# Patient Record
Sex: FEMALE | Race: WHITE | Employment: OTHER | URBAN - METROPOLITAN AREA
[De-identification: names, ages, dates, MRNs, and addresses within clinical notes are randomized per-mention and may not be internally consistent; named-entity substitution may affect disease eponyms.]

---

## 2017-01-30 PROBLEM — E55.9 VITAMIN D DEFICIENCY: Status: ACTIVE | Noted: 2017-01-30

## 2017-01-30 PROBLEM — E03.9 HYPOTHYROIDISM: Status: ACTIVE | Noted: 2017-01-30

## 2019-04-12 ENCOUNTER — HOSPITAL ENCOUNTER (OUTPATIENT)
Age: 59
Discharge: HOME | End: 2019-04-12
Payer: COMMERCIAL

## 2019-04-12 LAB
ALBUMIN SERPL-MCNC: 4.5 G/DL (ref 3.5–5.2)
ALP BLD-CCNC: 72 U/L (ref 35–104)
ALT SERPL-CCNC: 18 U/L (ref 0–32)
ANION GAP SERPL CALCULATED.3IONS-SCNC: 9 MMOL/L (ref 7–16)
AST SERPL-CCNC: 22 U/L (ref 0–31)
BASOPHILS ABSOLUTE: 0.06 E9/L (ref 0–0.2)
BASOPHILS RELATIVE PERCENT: 1.2 % (ref 0–2)
BILIRUB SERPL-MCNC: 0.3 MG/DL (ref 0–1.2)
BUN BLDV-MCNC: 13 MG/DL (ref 6–20)
CALCIUM SERPL-MCNC: 9.5 MG/DL (ref 8.6–10.2)
CHLORIDE BLD-SCNC: 103 MMOL/L (ref 98–107)
CHOLESTEROL, TOTAL: 178 MG/DL (ref 0–199)
CO2: 29 MMOL/L (ref 22–29)
CREAT SERPL-MCNC: 0.8 MG/DL (ref 0.5–1)
EOSINOPHILS ABSOLUTE: 0.15 E9/L (ref 0.05–0.5)
EOSINOPHILS RELATIVE PERCENT: 3 % (ref 0–6)
GFR AFRICAN AMERICAN: >60
GFR NON-AFRICAN AMERICAN: >60 ML/MIN/1.73
GLUCOSE BLD-MCNC: 86 MG/DL (ref 74–99)
HCT VFR BLD CALC: 42.2 % (ref 34–48)
HDLC SERPL-MCNC: 78 MG/DL
HEMOGLOBIN: 14.1 G/DL (ref 11.5–15.5)
IMMATURE GRANULOCYTES #: 0.05 E9/L
IMMATURE GRANULOCYTES %: 1 % (ref 0–5)
LDL CHOLESTEROL CALCULATED: 90 MG/DL (ref 0–99)
LYMPHOCYTES ABSOLUTE: 1.92 E9/L (ref 1.5–4)
LYMPHOCYTES RELATIVE PERCENT: 38.4 % (ref 20–42)
MCH RBC QN AUTO: 32.3 PG (ref 26–35)
MCHC RBC AUTO-ENTMCNC: 33.4 % (ref 32–34.5)
MCV RBC AUTO: 96.8 FL (ref 80–99.9)
MONOCYTES ABSOLUTE: 0.62 E9/L (ref 0.1–0.95)
MONOCYTES RELATIVE PERCENT: 12.4 % (ref 2–12)
NEUTROPHILS ABSOLUTE: 2.2 E9/L (ref 1.8–7.3)
NEUTROPHILS RELATIVE PERCENT: 44 % (ref 43–80)
PDW BLD-RTO: 13 FL (ref 11.5–15)
PLATELET # BLD: 318 E9/L (ref 130–450)
PMV BLD AUTO: 9.6 FL (ref 7–12)
POTASSIUM SERPL-SCNC: 4.7 MMOL/L (ref 3.5–5)
RBC # BLD: 4.36 E12/L (ref 3.5–5.5)
SODIUM BLD-SCNC: 141 MMOL/L (ref 132–146)
T4 FREE: 1.07 NG/DL (ref 0.93–1.7)
TOTAL PROTEIN: 6.9 G/DL (ref 6.4–8.3)
TRIGL SERPL-MCNC: 51 MG/DL (ref 0–149)
TSH SERPL DL<=0.05 MIU/L-ACNC: 3.35 UIU/ML (ref 0.27–4.2)
VITAMIN D 25-HYDROXY: 32 NG/ML (ref 30–100)
VLDLC SERPL CALC-MCNC: 10 MG/DL
WBC # BLD: 5 E9/L (ref 4.5–11.5)

## 2019-04-12 PROCEDURE — 80053 COMPREHEN METABOLIC PANEL: CPT

## 2019-04-12 PROCEDURE — 84443 ASSAY THYROID STIM HORMONE: CPT

## 2019-04-12 PROCEDURE — 85025 COMPLETE CBC W/AUTO DIFF WBC: CPT

## 2019-04-12 PROCEDURE — 80061 LIPID PANEL: CPT

## 2019-04-12 PROCEDURE — 82306 VITAMIN D 25 HYDROXY: CPT

## 2019-04-12 PROCEDURE — 84439 ASSAY OF FREE THYROXINE: CPT

## 2019-04-12 PROCEDURE — 36415 COLL VENOUS BLD VENIPUNCTURE: CPT

## 2019-10-14 ENCOUNTER — HOSPITAL ENCOUNTER (OUTPATIENT)
Age: 59
Discharge: HOME | End: 2019-10-14
Payer: COMMERCIAL

## 2019-10-14 LAB
ALBUMIN SERPL-MCNC: 4.8 G/DL (ref 3.5–5.2)
ALP BLD-CCNC: 55 U/L (ref 35–104)
ALT SERPL-CCNC: 16 U/L (ref 0–32)
ANION GAP SERPL CALCULATED.3IONS-SCNC: 10 MMOL/L (ref 7–16)
AST SERPL-CCNC: 22 U/L (ref 0–31)
BASOPHILS ABSOLUTE: 0.04 E9/L (ref 0–0.2)
BASOPHILS RELATIVE PERCENT: 0.8 % (ref 0–2)
BILIRUB SERPL-MCNC: 0.7 MG/DL (ref 0–1.2)
BUN BLDV-MCNC: 10 MG/DL (ref 6–20)
CALCIUM SERPL-MCNC: 9.8 MG/DL (ref 8.6–10.2)
CHLORIDE BLD-SCNC: 99 MMOL/L (ref 98–107)
CHOLESTEROL, TOTAL: 202 MG/DL (ref 0–199)
CO2: 28 MMOL/L (ref 22–29)
CREAT SERPL-MCNC: 0.8 MG/DL (ref 0.5–1)
EOSINOPHILS ABSOLUTE: 0.14 E9/L (ref 0.05–0.5)
EOSINOPHILS RELATIVE PERCENT: 2.8 % (ref 0–6)
GFR AFRICAN AMERICAN: >60
GFR NON-AFRICAN AMERICAN: >60 ML/MIN/1.73
GLUCOSE BLD-MCNC: 84 MG/DL (ref 74–99)
HCT VFR BLD CALC: 39.7 % (ref 34–48)
HDLC SERPL-MCNC: 101 MG/DL
HEMOGLOBIN: 13.6 G/DL (ref 11.5–15.5)
IMMATURE GRANULOCYTES #: 0.03 E9/L
IMMATURE GRANULOCYTES %: 0.6 % (ref 0–5)
LDL CHOLESTEROL CALCULATED: 91 MG/DL (ref 0–99)
LYMPHOCYTES ABSOLUTE: 1.47 E9/L (ref 1.5–4)
LYMPHOCYTES RELATIVE PERCENT: 29.8 % (ref 20–42)
MCH RBC QN AUTO: 33.9 PG (ref 26–35)
MCHC RBC AUTO-ENTMCNC: 34.3 % (ref 32–34.5)
MCV RBC AUTO: 99 FL (ref 80–99.9)
MONOCYTES ABSOLUTE: 0.53 E9/L (ref 0.1–0.95)
MONOCYTES RELATIVE PERCENT: 10.7 % (ref 2–12)
NEUTROPHILS ABSOLUTE: 2.73 E9/L (ref 1.8–7.3)
NEUTROPHILS RELATIVE PERCENT: 55.3 % (ref 43–80)
PDW BLD-RTO: 13.7 FL (ref 11.5–15)
PLATELET # BLD: 255 E9/L (ref 130–450)
PMV BLD AUTO: 9.3 FL (ref 7–12)
POTASSIUM SERPL-SCNC: 4.4 MMOL/L (ref 3.5–5)
RBC # BLD: 4.01 E12/L (ref 3.5–5.5)
SODIUM BLD-SCNC: 137 MMOL/L (ref 132–146)
T4 TOTAL: 4.7 MCG/DL (ref 4.5–11.7)
TOTAL PROTEIN: 7.3 G/DL (ref 6.4–8.3)
TRIGL SERPL-MCNC: 51 MG/DL (ref 0–149)
TSH SERPL DL<=0.05 MIU/L-ACNC: 2.5 UIU/ML (ref 0.27–4.2)
VITAMIN D 25-HYDROXY: 39 NG/ML (ref 30–100)
VLDLC SERPL CALC-MCNC: 10 MG/DL
WBC # BLD: 4.9 E9/L (ref 4.5–11.5)

## 2019-10-14 PROCEDURE — 36415 COLL VENOUS BLD VENIPUNCTURE: CPT

## 2019-10-14 PROCEDURE — 85025 COMPLETE CBC W/AUTO DIFF WBC: CPT

## 2019-10-14 PROCEDURE — 80053 COMPREHEN METABOLIC PANEL: CPT

## 2019-10-14 PROCEDURE — 80061 LIPID PANEL: CPT

## 2019-10-14 PROCEDURE — 82306 VITAMIN D 25 HYDROXY: CPT

## 2019-10-14 PROCEDURE — 84443 ASSAY THYROID STIM HORMONE: CPT

## 2019-10-14 PROCEDURE — 84436 ASSAY OF TOTAL THYROXINE: CPT

## 2019-10-25 ENCOUNTER — OFFICE VISIT (OUTPATIENT)
Dept: PRIMARY CARE CLINIC | Age: 59
End: 2019-10-25
Payer: COMMERCIAL

## 2019-10-25 VITALS
HEIGHT: 63 IN | SYSTOLIC BLOOD PRESSURE: 124 MMHG | BODY MASS INDEX: 23.07 KG/M2 | WEIGHT: 130.2 LBS | RESPIRATION RATE: 98 BRPM | DIASTOLIC BLOOD PRESSURE: 68 MMHG | HEART RATE: 52 BPM

## 2019-10-25 DIAGNOSIS — E03.9 HYPOTHYROIDISM, UNSPECIFIED TYPE: ICD-10-CM

## 2019-10-25 DIAGNOSIS — M19.042 PRIMARY OSTEOARTHRITIS OF BOTH HANDS: ICD-10-CM

## 2019-10-25 DIAGNOSIS — M81.0 OSTEOPOROSIS WITHOUT CURRENT PATHOLOGICAL FRACTURE, UNSPECIFIED OSTEOPOROSIS TYPE: ICD-10-CM

## 2019-10-25 DIAGNOSIS — E78.2 MIXED HYPERLIPIDEMIA: ICD-10-CM

## 2019-10-25 DIAGNOSIS — M19.041 PRIMARY OSTEOARTHRITIS OF BOTH HANDS: ICD-10-CM

## 2019-10-25 DIAGNOSIS — Z12.39 SCREENING FOR MALIGNANT NEOPLASM OF BREAST: ICD-10-CM

## 2019-10-25 DIAGNOSIS — M81.0 AGE-RELATED OSTEOPOROSIS WITHOUT CURRENT PATHOLOGICAL FRACTURE: ICD-10-CM

## 2019-10-25 DIAGNOSIS — Z12.11 SCREENING FOR MALIGNANT NEOPLASM OF COLON: Primary | ICD-10-CM

## 2019-10-25 PROBLEM — E55.9 VITAMIN D DEFICIENCY: Chronic | Status: ACTIVE | Noted: 2017-01-30

## 2019-10-25 PROCEDURE — 99214 OFFICE O/P EST MOD 30 MIN: CPT | Performed by: FAMILY MEDICINE

## 2019-10-25 RX ORDER — LEVOTHYROXINE SODIUM 0.07 MG/1
75 TABLET ORAL DAILY
Qty: 90 TABLET | Refills: 3 | Status: SHIPPED
Start: 2019-10-25 | End: 2020-10-27 | Stop reason: SDUPTHER

## 2019-10-25 ASSESSMENT — PATIENT HEALTH QUESTIONNAIRE - PHQ9
SUM OF ALL RESPONSES TO PHQ9 QUESTIONS 1 & 2: 0
2. FEELING DOWN, DEPRESSED OR HOPELESS: 0
SUM OF ALL RESPONSES TO PHQ QUESTIONS 1-9: 0
SUM OF ALL RESPONSES TO PHQ QUESTIONS 1-9: 0
1. LITTLE INTEREST OR PLEASURE IN DOING THINGS: 0

## 2019-10-25 ASSESSMENT — ENCOUNTER SYMPTOMS
ALLERGIC/IMMUNOLOGIC NEGATIVE: 1
GASTROINTESTINAL NEGATIVE: 1
RESPIRATORY NEGATIVE: 1
EYES NEGATIVE: 1

## 2020-01-27 ENCOUNTER — TELEPHONE (OUTPATIENT)
Dept: PRIMARY CARE CLINIC | Age: 60
End: 2020-01-27

## 2020-01-27 NOTE — TELEPHONE ENCOUNTER
Left message to return call. Need to know if patient plans on sending in Cologuard kit or if we need to cancel order.

## 2020-10-22 ENCOUNTER — HOSPITAL ENCOUNTER (OUTPATIENT)
Age: 60
Discharge: HOME | End: 2020-10-22
Payer: COMMERCIAL

## 2020-10-22 LAB
ALBUMIN SERPL-MCNC: 4.3 G/DL (ref 3.5–5.2)
ALP BLD-CCNC: 74 U/L (ref 35–104)
ALT SERPL-CCNC: 17 U/L (ref 0–32)
ANION GAP SERPL CALCULATED.3IONS-SCNC: 7 MMOL/L (ref 7–16)
AST SERPL-CCNC: 25 U/L (ref 0–31)
BASOPHILS ABSOLUTE: 0.04 E9/L (ref 0–0.2)
BASOPHILS RELATIVE PERCENT: 1 % (ref 0–2)
BILIRUB SERPL-MCNC: 0.4 MG/DL (ref 0–1.2)
BILIRUBIN URINE: NEGATIVE
BLOOD, URINE: NEGATIVE
BUN BLDV-MCNC: 11 MG/DL (ref 6–20)
CALCIUM SERPL-MCNC: 9.6 MG/DL (ref 8.6–10.2)
CHLORIDE BLD-SCNC: 99 MMOL/L (ref 98–107)
CHOLESTEROL, TOTAL: 202 MG/DL (ref 0–199)
CLARITY: CLEAR
CO2: 30 MMOL/L (ref 22–29)
COLOR: YELLOW
CREAT SERPL-MCNC: 0.7 MG/DL (ref 0.5–1)
EOSINOPHILS ABSOLUTE: 0.14 E9/L (ref 0.05–0.5)
EOSINOPHILS RELATIVE PERCENT: 3.3 % (ref 0–6)
GFR AFRICAN AMERICAN: >60
GFR NON-AFRICAN AMERICAN: >60 ML/MIN/1.73
GLUCOSE BLD-MCNC: 95 MG/DL (ref 74–99)
GLUCOSE URINE: NEGATIVE MG/DL
HCT VFR BLD CALC: 40.2 % (ref 34–48)
HDLC SERPL-MCNC: 92 MG/DL
HEMOGLOBIN: 13.3 G/DL (ref 11.5–15.5)
IMMATURE GRANULOCYTES #: 0.01 E9/L
IMMATURE GRANULOCYTES %: 0.2 % (ref 0–5)
KETONES, URINE: NEGATIVE MG/DL
LDL CHOLESTEROL CALCULATED: 100 MG/DL (ref 0–99)
LEUKOCYTE ESTERASE, URINE: NEGATIVE
LYMPHOCYTES ABSOLUTE: 1.36 E9/L (ref 1.5–4)
LYMPHOCYTES RELATIVE PERCENT: 32.5 % (ref 20–42)
MCH RBC QN AUTO: 32.8 PG (ref 26–35)
MCHC RBC AUTO-ENTMCNC: 33.1 % (ref 32–34.5)
MCV RBC AUTO: 99 FL (ref 80–99.9)
MONOCYTES ABSOLUTE: 0.54 E9/L (ref 0.1–0.95)
MONOCYTES RELATIVE PERCENT: 12.9 % (ref 2–12)
NEUTROPHILS ABSOLUTE: 2.09 E9/L (ref 1.8–7.3)
NEUTROPHILS RELATIVE PERCENT: 50.1 % (ref 43–80)
NITRITE, URINE: NEGATIVE
PDW BLD-RTO: 13.7 FL (ref 11.5–15)
PH UA: 7.5 (ref 5–9)
PLATELET # BLD: 262 E9/L (ref 130–450)
PMV BLD AUTO: 10 FL (ref 7–12)
POTASSIUM SERPL-SCNC: 4.3 MMOL/L (ref 3.5–5)
PROTEIN UA: NEGATIVE MG/DL
RBC # BLD: 4.06 E12/L (ref 3.5–5.5)
SODIUM BLD-SCNC: 136 MMOL/L (ref 132–146)
SPECIFIC GRAVITY UA: 1.01 (ref 1–1.03)
T4 TOTAL: 5.2 MCG/DL (ref 4.5–11.7)
TOTAL PROTEIN: 7.1 G/DL (ref 6.4–8.3)
TRIGL SERPL-MCNC: 51 MG/DL (ref 0–149)
TSH SERPL DL<=0.05 MIU/L-ACNC: 4.39 UIU/ML (ref 0.27–4.2)
UROBILINOGEN, URINE: 0.2 E.U./DL
VITAMIN D 25-HYDROXY: 32 NG/ML (ref 30–100)
VLDLC SERPL CALC-MCNC: 10 MG/DL
WBC # BLD: 4.2 E9/L (ref 4.5–11.5)

## 2020-10-22 PROCEDURE — 81003 URINALYSIS AUTO W/O SCOPE: CPT

## 2020-10-22 PROCEDURE — 85025 COMPLETE CBC W/AUTO DIFF WBC: CPT

## 2020-10-22 PROCEDURE — 36415 COLL VENOUS BLD VENIPUNCTURE: CPT

## 2020-10-22 PROCEDURE — 82306 VITAMIN D 25 HYDROXY: CPT

## 2020-10-22 PROCEDURE — 84443 ASSAY THYROID STIM HORMONE: CPT

## 2020-10-22 PROCEDURE — 84436 ASSAY OF TOTAL THYROXINE: CPT

## 2020-10-22 PROCEDURE — 80053 COMPREHEN METABOLIC PANEL: CPT

## 2020-10-22 PROCEDURE — 80061 LIPID PANEL: CPT

## 2020-10-27 RX ORDER — LEVOTHYROXINE SODIUM 0.07 MG/1
75 TABLET ORAL DAILY
Qty: 90 TABLET | Refills: 3 | Status: SHIPPED
Start: 2020-10-27 | End: 2020-10-30 | Stop reason: SDUPTHER

## 2020-10-30 ENCOUNTER — OFFICE VISIT (OUTPATIENT)
Dept: PRIMARY CARE CLINIC | Age: 60
End: 2020-10-30
Payer: COMMERCIAL

## 2020-10-30 VITALS
BODY MASS INDEX: 23.74 KG/M2 | DIASTOLIC BLOOD PRESSURE: 78 MMHG | TEMPERATURE: 97.8 F | SYSTOLIC BLOOD PRESSURE: 127 MMHG | HEIGHT: 63 IN | WEIGHT: 134 LBS | RESPIRATION RATE: 14 BRPM

## 2020-10-30 PROBLEM — Z00.01 ENCOUNTER FOR ANNUAL GENERAL MEDICAL EXAMINATION WITH ABNORMAL FINDINGS IN ADULT: Status: ACTIVE | Noted: 2020-10-30

## 2020-10-30 PROCEDURE — 99396 PREV VISIT EST AGE 40-64: CPT | Performed by: FAMILY MEDICINE

## 2020-10-30 RX ORDER — LEVOTHYROXINE SODIUM 88 UG/1
88 TABLET ORAL DAILY
Qty: 90 TABLET | Refills: 12 | Status: SHIPPED
Start: 2020-10-30 | End: 2021-11-12 | Stop reason: SDUPTHER

## 2020-10-30 ASSESSMENT — PATIENT HEALTH QUESTIONNAIRE - PHQ9
SUM OF ALL RESPONSES TO PHQ QUESTIONS 1-9: 0
SUM OF ALL RESPONSES TO PHQ9 QUESTIONS 1 & 2: 0
1. LITTLE INTEREST OR PLEASURE IN DOING THINGS: 0
SUM OF ALL RESPONSES TO PHQ QUESTIONS 1-9: 0
2. FEELING DOWN, DEPRESSED OR HOPELESS: 0
SUM OF ALL RESPONSES TO PHQ QUESTIONS 1-9: 0

## 2020-10-30 ASSESSMENT — ENCOUNTER SYMPTOMS
ALLERGIC/IMMUNOLOGIC NEGATIVE: 1
GASTROINTESTINAL NEGATIVE: 1
EYES NEGATIVE: 1
RESPIRATORY NEGATIVE: 1

## 2020-10-30 NOTE — PROGRESS NOTES
10/30/20  Name: Isabel Byrne    : 1960    Sex: female    Age: 62 y.o. Subjective:  Chief Complaint: Patient is here for one year ck   Re  thryoid and   Wellness      Feel ok  No cp or sob    Wt good   Busy takign care of mom  xrm and  dexa  Done and ok    tsh up      Review of Systems   Constitutional: Negative. HENT: Negative. Eyes: Negative. Respiratory: Negative. Cardiovascular: Negative. Gastrointestinal: Negative. Endocrine: Negative. Genitourinary: Negative. Musculoskeletal: Negative. Skin: Negative. Allergic/Immunologic: Negative. Neurological: Negative. Hematological: Negative. Psychiatric/Behavioral: Negative.           Current Outpatient Medications:     levothyroxine (SYNTHROID) 88 MCG tablet, Take 1 tablet by mouth daily, Disp: 90 tablet, Rfl: 12  No Known Allergies  Social History     Socioeconomic History    Marital status: Single     Spouse name: Not on file    Number of children: Not on file    Years of education: Not on file    Highest education level: Not on file   Occupational History    Not on file   Social Needs    Financial resource strain: Not on file    Food insecurity     Worry: Not on file     Inability: Not on file    Transportation needs     Medical: Not on file     Non-medical: Not on file   Tobacco Use    Smoking status: Never Smoker    Smokeless tobacco: Never Used   Substance and Sexual Activity    Alcohol use: Yes     Comment: occassionally    Drug use: No    Sexual activity: Not on file   Lifestyle    Physical activity     Days per week: Not on file     Minutes per session: Not on file    Stress: Not on file   Relationships    Social connections     Talks on phone: Not on file     Gets together: Not on file     Attends Zoroastrian service: Not on file     Active member of club or organization: Not on file     Attends meetings of clubs or organizations: Not on file     Relationship status: Not on file    Intimate partner violence     Fear of current or ex partner: Not on file     Emotionally abused: Not on file     Physically abused: Not on file     Forced sexual activity: Not on file   Other Topics Concern    Not on file   Social History Narrative            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    [employer]    HYPOTHYROIDISM---PT STOPPED THYROID MED ON OWN 1-14    OSTEOPENIA HIP NECK OF 1.1 10-11    BOUGHT CUSTOM AWARDS TROPHY SHOT -14    PLAYS TENNIS    POSSIBLE CTS 7-14 AND OA R THIRD PIP JOINT    REFUSES COLONOSCOPY -    CARES FOR PARENTS  87 93    Dad  at 80   12-19    Mom living  80  10-20      Past Medical History:   Diagnosis Date    Amenorrhea     Hyperlipidemia     Hypothyroidism     Hypothyroidism     Osteopenia of hip     Polycystic ovaries      Family History   Problem Relation Age of Onset    Other Mother     Osteoporosis Mother     Arthritis Mother     Heart Disease Mother     Alzheimer's Disease Father       No past surgical history on file. Vitals:    10/30/20 1355   BP: 127/78   Resp: 14   Temp: 97.8 °F (36.6 °C)   TempSrc: Temporal   Weight: 134 lb (60.8 kg)   Height: 5' 3\" (1.6 m)       Objective:    Physical Exam  Vitals signs reviewed. Constitutional:       Appearance: She is well-developed. HENT:      Head: Normocephalic. Eyes:      Pupils: Pupils are equal, round, and reactive to light. Neck:      Musculoskeletal: Normal range of motion. Cardiovascular:      Rate and Rhythm: Normal rate and regular rhythm. Pulmonary:      Effort: Pulmonary effort is normal.      Breath sounds: Normal breath sounds. Abdominal:      Palpations: Abdomen is soft. Musculoskeletal: Normal range of motion. Skin:     General: Skin is warm. Neurological:      Mental Status: She is alert and oriented to person, place, and time.    Psychiatric:         Behavior: Behavior normal.         Maritza Morocho was seen today for annual exam.    Diagnoses and all orders for this visit:    Encounter for annual general medical examination with abnormal findings in adult    Hypothyroidism, unspecified type  -     levothyroxine (SYNTHROID) 88 MCG tablet; Take 1 tablet by mouth daily  -     T4; Future  -     TSH without Reflex; Future        Comments: lpow fat and sguar diet  Inc  syn 88   Hm  Issues   A great deal of time spent reviewing medications, diet, exercise, social issues. Also reviewing the chart before entering the room with patient and finishing charting after leaving patient's room. More than half of that time was spent face to face with the patient in counseling and coordinating care. He never did  cologuad last year    Follow Up: Return in about 1 year (around 10/30/2021) for Lab Before.      Seen by:  Sarah Mckeon,

## 2021-01-23 ENCOUNTER — OFFICE VISIT (OUTPATIENT)
Dept: FAMILY MEDICINE CLINIC | Age: 61
End: 2021-01-23
Payer: COMMERCIAL

## 2021-01-23 VITALS
SYSTOLIC BLOOD PRESSURE: 130 MMHG | BODY MASS INDEX: 24.09 KG/M2 | WEIGHT: 136 LBS | HEART RATE: 69 BPM | DIASTOLIC BLOOD PRESSURE: 84 MMHG | TEMPERATURE: 97.2 F | OXYGEN SATURATION: 98 %

## 2021-01-23 DIAGNOSIS — E86.0 DEHYDRATION: ICD-10-CM

## 2021-01-23 DIAGNOSIS — M54.50 LEFT-SIDED LOW BACK PAIN WITHOUT SCIATICA, UNSPECIFIED CHRONICITY: Primary | ICD-10-CM

## 2021-01-23 LAB
BILIRUBIN, POC: NORMAL
BLOOD URINE, POC: NORMAL
CLARITY, POC: CLEAR
COLOR, POC: YELLOW
GLUCOSE URINE, POC: NORMAL
KETONES, POC: NORMAL
LEUKOCYTE EST, POC: NORMAL
NITRITE, POC: NORMAL
PH, POC: 7
PROTEIN, POC: NORMAL
SPECIFIC GRAVITY, POC: 1.01
UROBILINOGEN, POC: 0.2

## 2021-01-23 PROCEDURE — 81002 URINALYSIS NONAUTO W/O SCOPE: CPT | Performed by: FAMILY MEDICINE

## 2021-01-23 PROCEDURE — 99212 OFFICE O/P EST SF 10 MIN: CPT | Performed by: FAMILY MEDICINE

## 2021-01-23 ASSESSMENT — ENCOUNTER SYMPTOMS
TROUBLE SWALLOWING: 0
BACK PAIN: 1
NAUSEA: 0
SORE THROAT: 0
DIARRHEA: 0
ABDOMINAL PAIN: 0
WHEEZING: 0
CONSTIPATION: 0
EYE PAIN: 0
SINUS PAIN: 0
SHORTNESS OF BREATH: 0
COUGH: 0
CHEST TIGHTNESS: 0
VOMITING: 0

## 2021-01-23 NOTE — PROGRESS NOTES
21    Name: Erskine Riedel  :1960   Sex:female   Age:60 y.o. Chief Complaint   Patient presents with    Back Pain     x 5 days on left side      Patient here with left side pain on left side of back, started Monday last week, feels achy  Does not feel muscular, she is a  and she says she would know if it was muscular    Does not drink enough fluids  Still smoking  Started to drink more water yesterday and took 3 amoxicillin that she has at home from something else  She thiinks she feels a little better today    No history of kidney stones  No fevers  No abdominal pain  No change in bowel or bladder habits          Review of Systems   Constitutional: Negative for appetite change, fatigue and fever. HENT: Negative for congestion, ear pain, sinus pain, sore throat and trouble swallowing. Eyes: Negative for pain. Respiratory: Negative for cough, chest tightness, shortness of breath and wheezing. Cardiovascular: Negative for chest pain, palpitations and leg swelling. Gastrointestinal: Negative for abdominal pain, constipation, diarrhea, nausea and vomiting. Endocrine: Negative for cold intolerance and heat intolerance. Genitourinary: Negative for difficulty urinating, hematuria and pelvic pain. Musculoskeletal: Positive for back pain. Negative for gait problem and joint swelling. Skin: Negative for rash and wound. Neurological: Negative for dizziness, syncope and headaches. Hematological: Negative for adenopathy. Psychiatric/Behavioral: Negative for confusion, sleep disturbance and suicidal ideas.            Current Outpatient Medications:     levothyroxine (SYNTHROID) 88 MCG tablet, Take 1 tablet by mouth daily, Disp: 90 tablet, Rfl: 12  No Known Allergies   Past Medical History:   Diagnosis Date    Amenorrhea     Hyperlipidemia     Hypothyroidism     Hypothyroidism     Osteopenia of hip     Polycystic ovaries      Patient Active Problem List    Diagnosis Date Noted pain.    Diagnoses and all orders for this visit:    Left-sided low back pain without sciatica, unspecified chronicity  Comments:  possible kidney aching from not drinking neough fluids, cystitis or kidney stone  push fluids'  rechecknext week  urine cx  Orders:  -     POCT Urinalysis no Micro    Dehydration  Comments:  60-64oz fluids daily  cranberry juice and water  f/unext week if not getting better        Seen by:   Wes Jaimes, DO

## 2021-01-25 ENCOUNTER — OFFICE VISIT (OUTPATIENT)
Dept: PRIMARY CARE CLINIC | Age: 61
End: 2021-01-25
Payer: COMMERCIAL

## 2021-01-25 VITALS
TEMPERATURE: 96.9 F | HEART RATE: 73 BPM | WEIGHT: 133 LBS | SYSTOLIC BLOOD PRESSURE: 126 MMHG | DIASTOLIC BLOOD PRESSURE: 78 MMHG | OXYGEN SATURATION: 97 % | BODY MASS INDEX: 23.56 KG/M2

## 2021-01-25 DIAGNOSIS — N20.0 KIDNEY STONE: Primary | ICD-10-CM

## 2021-01-25 PROCEDURE — 99213 OFFICE O/P EST LOW 20 MIN: CPT | Performed by: FAMILY MEDICINE

## 2021-01-25 PROCEDURE — 81002 URINALYSIS NONAUTO W/O SCOPE: CPT | Performed by: FAMILY MEDICINE

## 2021-01-25 ASSESSMENT — PATIENT HEALTH QUESTIONNAIRE - PHQ9
1. LITTLE INTEREST OR PLEASURE IN DOING THINGS: 0
SUM OF ALL RESPONSES TO PHQ QUESTIONS 1-9: 0
SUM OF ALL RESPONSES TO PHQ QUESTIONS 1-9: 0
2. FEELING DOWN, DEPRESSED OR HOPELESS: 0

## 2021-01-25 ASSESSMENT — ENCOUNTER SYMPTOMS
RESPIRATORY NEGATIVE: 1
ALLERGIC/IMMUNOLOGIC NEGATIVE: 1
EYES NEGATIVE: 1
GASTROINTESTINAL NEGATIVE: 1

## 2021-01-25 NOTE — PROGRESS NOTES
21  Name: Basim Christensen    : 1960    Sex: female    Age: 62 y.o. Subjective:  Chief Complaint: Patient is here for left low back pain     Pt in [facility]    Sat   ua with race blood       Sl  Rot to   Right  lwoe r abd     2-10 scale  dulla pam       Review of Systems   Constitutional: Negative. HENT: Negative. Eyes: Negative. Respiratory: Negative. Cardiovascular: Negative. Gastrointestinal: Negative. Endocrine: Negative. Genitourinary: Negative. See  hpi   Musculoskeletal: Negative. Skin: Negative. Allergic/Immunologic: Negative. Neurological: Negative. Hematological: Negative. Psychiatric/Behavioral: Negative.           Current Outpatient Medications:     levothyroxine (SYNTHROID) 88 MCG tablet, Take 1 tablet by mouth daily, Disp: 90 tablet, Rfl: 12  No Known Allergies  Social History     Socioeconomic History    Marital status: Single     Spouse name: Not on file    Number of children: Not on file    Years of education: Not on file    Highest education level: Not on file   Occupational History    Not on file   Social Needs    Financial resource strain: Not on file    Food insecurity     Worry: Not on file     Inability: Not on file    Transportation needs     Medical: Not on file     Non-medical: Not on file   Tobacco Use    Smoking status: Never Smoker    Smokeless tobacco: Never Used   Substance and Sexual Activity    Alcohol use: Yes     Comment: occassionally    Drug use: No    Sexual activity: Not on file   Lifestyle    Physical activity     Days per week: Not on file     Minutes per session: Not on file    Stress: Not on file   Relationships    Social connections     Talks on phone: Not on file     Gets together: Not on file     Attends Religion service: Not on file     Active member of club or organization: Not on file     Attends meetings of clubs or organizations: Not on file     Relationship status: Not on file    Intimate partner violence     Fear of current or ex partner: Not on file     Emotionally abused: Not on file     Physically abused: Not on file     Forced sexual activity: Not on file   Other Topics Concern    Not on file   Social History Narrative            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    [employer]    HYPOTHYROIDISM---PT STOPPED THYROID MED ON OWN 1-14    OSTEOPENIA HIP NECK OF 1.1 10-11    BOUGHT CUSTOM AWARDS TROPHY SHOT -14    PLAYS TENNIS    POSSIBLE CTS 7-14 AND OA R THIRD PIP JOINT    REFUSES COLONOSCOPY     CARES FOR PARENTS  87 93    Dad  at 80   12-19    Mom living  80  10-20      Past Medical History:   Diagnosis Date    Amenorrhea     Hyperlipidemia     Hypothyroidism     Hypothyroidism     Osteopenia of hip     Polycystic ovaries      Family History   Problem Relation Age of Onset    Other Mother     Osteoporosis Mother     Arthritis Mother     Heart Disease Mother     Alzheimer's Disease Father       No past surgical history on file. Vitals:    21 1411   BP: 126/78   Pulse: 73   Temp: 96.9 °F (36.1 °C)   SpO2: 97%   Weight: 133 lb (60.3 kg)       Objective:    Physical Exam  Vitals signs reviewed. Constitutional:       Appearance: She is well-developed. HENT:      Head: Normocephalic. Eyes:      Pupils: Pupils are equal, round, and reactive to light. Neck:      Musculoskeletal: Normal range of motion. Cardiovascular:      Rate and Rhythm: Normal rate and regular rhythm. Pulmonary:      Effort: Pulmonary effort is normal.      Breath sounds: Normal breath sounds. Abdominal:      Palpations: Abdomen is soft. Musculoskeletal: Normal range of motion. Comments: No tender with palp   Skin:     General: Skin is warm. Neurological:      Mental Status: She is alert and oriented to person, place, and time. Psychiatric:         Behavior: Behavior normal.         Kimmy Woods was seen today for back pain.     Diagnoses and all orders for this visit:    Kidney stone  -     US RETROPERITONEAL LIMITED; Future  -     XR ABDOMEN (KUB) (SINGLE AP VIEW); Future        Comments: us kidney  Call after  Worse to er A great deal of time spent reviewing medications, diet, exercise, social issues. Also reviewing the chart before entering the room with patient and finishing charting after leaving patient's room. More than half of that time was spent face to face with the patient in counseling and coordinating care. If nmot stone  Still pain    afte    24  h call here    Follow Up: No follow-ups on file.      Seen by:  Hillary Knox,

## 2021-01-26 ENCOUNTER — TELEPHONE (OUTPATIENT)
Dept: PRIMARY CARE CLINIC | Age: 61
End: 2021-01-26

## 2021-01-26 RX ORDER — BACLOFEN 10 MG/1
10 TABLET ORAL NIGHTLY PRN
Qty: 14 TABLET | Refills: 2 | Status: SHIPPED
Start: 2021-01-26 | End: 2021-11-12

## 2021-01-26 RX ORDER — METHYLPREDNISOLONE 4 MG/1
TABLET ORAL
Qty: 1 KIT | Refills: 0 | Status: SHIPPED
Start: 2021-01-26 | End: 2021-11-12

## 2021-01-26 NOTE — TELEPHONE ENCOUNTER
Tell patient ultrasound shows a very tidy 3 mm stone in the right kidney which should not be causing any pain. Nothing in the left kidney. It looks like this might turn out to be a muscular pain. If she wants I can call in a steroid pack and a muscle relaxer. Tell her I sent meds if she wants to try them.   If persist let me know

## 2021-01-28 DIAGNOSIS — R10.33 PERIUMBILICAL ABDOMINAL PAIN: Primary | ICD-10-CM

## 2021-01-29 ENCOUNTER — OFFICE VISIT (OUTPATIENT)
Dept: PRIMARY CARE CLINIC | Age: 61
End: 2021-01-29
Payer: COMMERCIAL

## 2021-01-29 VITALS
DIASTOLIC BLOOD PRESSURE: 78 MMHG | WEIGHT: 133 LBS | SYSTOLIC BLOOD PRESSURE: 126 MMHG | HEIGHT: 63 IN | BODY MASS INDEX: 23.57 KG/M2 | TEMPERATURE: 97 F

## 2021-01-29 DIAGNOSIS — N20.0 KIDNEY STONE: ICD-10-CM

## 2021-01-29 DIAGNOSIS — M54.50 ACUTE BILATERAL LOW BACK PAIN WITHOUT SCIATICA: ICD-10-CM

## 2021-01-29 DIAGNOSIS — R10.9 LEFT FLANK PAIN: ICD-10-CM

## 2021-01-29 DIAGNOSIS — R10.32 LEFT LOWER QUADRANT ABDOMINAL PAIN: Primary | ICD-10-CM

## 2021-01-29 DIAGNOSIS — R10.32 LEFT LOWER QUADRANT ABDOMINAL PAIN: ICD-10-CM

## 2021-01-29 LAB
ALBUMIN SERPL-MCNC: 4.9 G/DL (ref 3.5–5.2)
ALP BLD-CCNC: 55 U/L (ref 35–104)
ALT SERPL-CCNC: 15 U/L (ref 0–32)
ANION GAP SERPL CALCULATED.3IONS-SCNC: 16 MMOL/L (ref 7–16)
AST SERPL-CCNC: 24 U/L (ref 0–31)
BASOPHILS ABSOLUTE: 0.04 E9/L (ref 0–0.2)
BASOPHILS RELATIVE PERCENT: 0.8 % (ref 0–2)
BILIRUB SERPL-MCNC: 0.5 MG/DL (ref 0–1.2)
BILIRUBIN URINE: NEGATIVE
BLOOD, URINE: NORMAL
BUN BLDV-MCNC: 9 MG/DL (ref 6–20)
CALCIUM SERPL-MCNC: 10.1 MG/DL (ref 8.6–10.2)
CHLORIDE BLD-SCNC: 95 MMOL/L (ref 98–107)
CLARITY: CLEAR
CO2: 24 MMOL/L (ref 22–29)
COLOR: YELLOW
CREAT SERPL-MCNC: 0.7 MG/DL (ref 0.5–1)
EOSINOPHILS ABSOLUTE: 0.07 E9/L (ref 0.05–0.5)
EOSINOPHILS RELATIVE PERCENT: 1.4 % (ref 0–6)
GFR AFRICAN AMERICAN: >60
GFR NON-AFRICAN AMERICAN: >60 ML/MIN/1.73
GLUCOSE BLD-MCNC: 108 MG/DL (ref 74–99)
GLUCOSE URINE: NEGATIVE MG/DL
HCT VFR BLD CALC: 38.1 % (ref 34–48)
HEMOGLOBIN: 13.2 G/DL (ref 11.5–15.5)
IMMATURE GRANULOCYTES #: 0.02 E9/L
IMMATURE GRANULOCYTES %: 0.4 % (ref 0–5)
KETONES, URINE: NEGATIVE MG/DL
LEUKOCYTE ESTERASE, URINE: NEGATIVE
LYMPHOCYTES ABSOLUTE: 1.11 E9/L (ref 1.5–4)
LYMPHOCYTES RELATIVE PERCENT: 22.7 % (ref 20–42)
MCH RBC QN AUTO: 32.7 PG (ref 26–35)
MCHC RBC AUTO-ENTMCNC: 34.6 % (ref 32–34.5)
MCV RBC AUTO: 94.3 FL (ref 80–99.9)
MONOCYTES ABSOLUTE: 0.51 E9/L (ref 0.1–0.95)
MONOCYTES RELATIVE PERCENT: 10.5 % (ref 2–12)
NEUTROPHILS ABSOLUTE: 3.13 E9/L (ref 1.8–7.3)
NEUTROPHILS RELATIVE PERCENT: 64.2 % (ref 43–80)
NITRITE, URINE: NEGATIVE
PDW BLD-RTO: 13.2 FL (ref 11.5–15)
PH UA: 6 (ref 5–9)
PLATELET # BLD: 247 E9/L (ref 130–450)
PMV BLD AUTO: 9.6 FL (ref 7–12)
POTASSIUM SERPL-SCNC: 3.8 MMOL/L (ref 3.5–5)
PROTEIN UA: NEGATIVE MG/DL
RBC # BLD: 4.04 E12/L (ref 3.5–5.5)
SODIUM BLD-SCNC: 135 MMOL/L (ref 132–146)
SPECIFIC GRAVITY UA: <=1.005 (ref 1–1.03)
TOTAL PROTEIN: 7.4 G/DL (ref 6.4–8.3)
UROBILINOGEN, URINE: 0.2 E.U./DL
WBC # BLD: 4.9 E9/L (ref 4.5–11.5)

## 2021-01-29 PROCEDURE — 99214 OFFICE O/P EST MOD 30 MIN: CPT | Performed by: FAMILY MEDICINE

## 2021-01-29 ASSESSMENT — ENCOUNTER SYMPTOMS
ALLERGIC/IMMUNOLOGIC NEGATIVE: 1
ABDOMINAL PAIN: 1
BACK PAIN: 1
RESPIRATORY NEGATIVE: 1
EYES NEGATIVE: 1

## 2021-01-29 NOTE — PROGRESS NOTES
21  Name: Ricky Childers    : 1960    Sex: female    Age: 62 y.o. Subjective:  Chief Complaint: Patient is here for  Left flank pain      She  Feels worse with lying down  Did nto take the  Steroid  She   In convinced not musclar  Us with    samll right kidney stone   ask to see urol  No rad sx  Pain left mid and epigastic pain      Review of Systems   Constitutional: Negative. HENT: Negative. Eyes: Negative. Respiratory: Negative. Cardiovascular: Negative. Gastrointestinal: Positive for abdominal pain. Endocrine: Negative. Genitourinary: Negative. Musculoskeletal: Positive for back pain. Skin: Negative. Allergic/Immunologic: Negative. Neurological: Negative. Hematological: Negative. Psychiatric/Behavioral: Negative.           Current Outpatient Medications:     methylPREDNISolone (MEDROL DOSEPACK) 4 MG tablet, Take by mouth., Disp: 1 kit, Rfl: 0    baclofen (LIORESAL) 10 MG tablet, Take 1 tablet by mouth nightly as needed (spasm) Tired  Do not drive, Disp: 14 tablet, Rfl: 2    levothyroxine (SYNTHROID) 88 MCG tablet, Take 1 tablet by mouth daily, Disp: 90 tablet, Rfl: 12  No Known Allergies  Social History     Socioeconomic History    Marital status: Single     Spouse name: Not on file    Number of children: Not on file    Years of education: Not on file    Highest education level: Not on file   Occupational History    Not on file   Social Needs    Financial resource strain: Not on file    Food insecurity     Worry: Not on file     Inability: Not on file    Transportation needs     Medical: Not on file     Non-medical: Not on file   Tobacco Use    Smoking status: Never Smoker    Smokeless tobacco: Never Used   Substance and Sexual Activity    Alcohol use: Yes     Comment: occassionally    Drug use: No    Sexual activity: Not on file   Lifestyle    Physical activity     Days per week: Not on file     Minutes per session: Not on file    Stress: Not on file   Relationships    Social connections     Talks on phone: Not on file     Gets together: Not on file     Attends Quaker service: Not on file     Active member of club or organization: Not on file     Attends meetings of clubs or organizations: Not on file     Relationship status: Not on file    Intimate partner violence     Fear of current or ex partner: Not on file     Emotionally abused: Not on file     Physically abused: Not on file     Forced sexual activity: Not on file   Other Topics Concern    Not on file   Social History Narrative            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    [employer]    HYPOTHYROIDISM---PT STOPPED THYROID MED ON OWN 1-14    OSTEOPENIA HIP NECK OF 1.1 10-11    BOUGHT CUSTOM AWARDS "[employer]Y SHOT -14    PLAYS TENNIS    POSSIBLE CTS 7-14 AND OA R THIRD PIP JOINT    REFUSES COLONOSCOPY     CARES FOR PARENTS  87 93    Dad  at 80   12-19    Mom living  80  10-20      Past Medical History:   Diagnosis Date    Amenorrhea     Hyperlipidemia     Hypothyroidism     Hypothyroidism     Osteopenia of hip     Polycystic ovaries      Family History   Problem Relation Age of Onset    Other Mother     Osteoporosis Mother     Arthritis Mother     Heart Disease Mother     Alzheimer's Disease Father       No past surgical history on file. Vitals:    21 1341   BP: 126/78   Temp: 97 °F (36.1 °C)   Weight: 133 lb (60.3 kg)   Height: 5' 3\" (1.6 m)       Objective:    Physical Exam  Vitals signs reviewed. Constitutional:       Appearance: She is well-developed. HENT:      Head: Normocephalic. Eyes:      Pupils: Pupils are equal, round, and reactive to light. Neck:      Musculoskeletal: Normal range of motion. Cardiovascular:      Rate and Rhythm: Normal rate and regular rhythm. Pulmonary:      Effort: Pulmonary effort is normal.      Breath sounds: Normal breath sounds. Abdominal:      Palpations: Abdomen is soft.    Musculoskeletal: Normal range of motion. Skin:     General: Skin is warm. Neurological:      Mental Status: She is alert and oriented to person, place, and time. Psychiatric:         Behavior: Behavior normal.         Hector Ortiz was seen today for flank pain. Diagnoses and all orders for this visit:    Left lower quadrant abdominal pain  -     CBC Auto Differential; Future  -     Comprehensive Metabolic Panel; Future  -     Urinalysis; Future  -     Culture, Urine; Future  -     CT ABDOMEN PELVIS W WO CONTRAST Additional Contrast? None; Future  -     External Referral To Urology    Left flank pain  -     CBC Auto Differential; Future  -     Comprehensive Metabolic Panel; Future  -     Urinalysis; Future  -     Culture, Urine; Future  -     CT ABDOMEN PELVIS W WO CONTRAST Additional Contrast? None; Future  -     External Referral To Urology    Kidney stone  -     CBC Auto Differential; Future  -     Comprehensive Metabolic Panel; Future  -     Urinalysis; Future  -     Culture, Urine; Future  -     CT ABDOMEN PELVIS W WO CONTRAST Additional Contrast? None; Future  -     External Referral To Urology    Acute bilateral low back pain without sciatica        Comments: appt   urol per  Request    Ct  Bd pelvis  Lab  Worse toer ,A great deal of time spent reviewing medications, diet, exercise, social issues. Also reviewing the chart before entering the room with patient and finishing charting after leaving patient's room. More than half of that time was spent face to face with the patient in counseling and coordinating care.       Follow Up: Return for See Referral.     Seen by:  Garret hWyte,

## 2021-01-30 LAB
BACTERIA: NORMAL /HPF
RBC UA: NORMAL /HPF (ref 0–2)
WBC UA: NORMAL /HPF (ref 0–5)

## 2021-01-31 ENCOUNTER — HOSPITAL ENCOUNTER (OUTPATIENT)
Dept: CT IMAGING | Age: 61
Discharge: HOME | End: 2021-02-02
Payer: COMMERCIAL

## 2021-01-31 DIAGNOSIS — N20.0 KIDNEY STONE: ICD-10-CM

## 2021-01-31 DIAGNOSIS — R10.32 LEFT LOWER QUADRANT ABDOMINAL PAIN: ICD-10-CM

## 2021-01-31 DIAGNOSIS — R10.9 LEFT FLANK PAIN: ICD-10-CM

## 2021-01-31 PROCEDURE — 74178 CT ABD&PLV WO CNTR FLWD CNTR: CPT

## 2021-01-31 PROCEDURE — 6360000004 HC RX CONTRAST MEDICATION: Performed by: RADIOLOGY

## 2021-01-31 RX ADMIN — IOPAMIDOL 75 ML: 755 INJECTION, SOLUTION INTRAVENOUS at 13:19

## 2021-02-01 LAB — URINE CULTURE, ROUTINE: NORMAL

## 2021-02-02 ENCOUNTER — TELEPHONE (OUTPATIENT)
Dept: PRIMARY CARE CLINIC | Age: 61
End: 2021-02-02

## 2021-02-02 NOTE — TELEPHONE ENCOUNTER
Pt advised/says she has been taking a muscle relaxer at night before laying down and her pain is better

## 2021-02-02 NOTE — TELEPHONE ENCOUNTER
Notify patient lab okay. CAT scan of the abdomen and pelvis are perfect. Does show moderate degenerative changes of the last 2 vertebrae disc spaces.   Let me know if still having problems

## 2021-02-03 ENCOUNTER — TELEPHONE (OUTPATIENT)
Dept: PRIMARY CARE CLINIC | Age: 61
End: 2021-02-03

## 2021-02-03 NOTE — TELEPHONE ENCOUNTER
[facility] CALLED TO SEE WHY REFERRAL WAS DONE SINCE THEY DO NOT SEE ANYTHING ABNORMAL ON CT OR XAYS. ADVISED PER DR. ALICIA'S OFFICE PATIENT REQUESTED APPT WITH UROLOGY.   THEY WILL CONTACT PT TO SCHEDULE

## 2021-03-04 ENCOUNTER — HOSPITAL ENCOUNTER (OUTPATIENT)
Age: 61
Discharge: HOME | End: 2021-03-04
Payer: COMMERCIAL

## 2021-03-04 DIAGNOSIS — E03.9 HYPOTHYROIDISM, UNSPECIFIED TYPE: ICD-10-CM

## 2021-03-04 LAB
T4 TOTAL: 5.4 MCG/DL (ref 4.5–11.7)
TSH SERPL DL<=0.05 MIU/L-ACNC: 3.29 UIU/ML (ref 0.27–4.2)

## 2021-03-04 PROCEDURE — 84443 ASSAY THYROID STIM HORMONE: CPT

## 2021-03-04 PROCEDURE — 36415 COLL VENOUS BLD VENIPUNCTURE: CPT

## 2021-03-04 PROCEDURE — 84436 ASSAY OF TOTAL THYROXINE: CPT

## 2021-03-05 ENCOUNTER — TELEPHONE (OUTPATIENT)
Dept: PRIMARY CARE CLINIC | Age: 61
End: 2021-03-05

## 2021-03-05 NOTE — TELEPHONE ENCOUNTER
Let patient know that the thyroid studies were good. This is the right dose for her.   Regular appointment

## 2021-11-04 ENCOUNTER — TELEPHONE (OUTPATIENT)
Dept: PRIMARY CARE CLINIC | Age: 61
End: 2021-11-04

## 2021-11-04 NOTE — TELEPHONE ENCOUNTER
----- Message from Olena Every sent at 11/4/2021 10:17 AM EDT -----  Subject: Referral Request    QUESTIONS   Reason for referral request? Patient need bloodwork for appointment with   Dr Parker Higgins   Has the physician seen you for this condition before? Yes  Select a date? 2021-01-29  Select the Provider the patient wants to be referred to, if known (PCP or   Specialist)? Beto Preciado   Preferred Specialist (if applicable)? Do you already have an appointment scheduled? Yes  Select Scheduled Date? 2021-11-12  Select Scheduled Physician? Ramo Preciado   Additional Information for Provider?   ---------------------------------------------------------------------------  --------------  Inocencia DANIELS  What is the best way for the office to contact you? OK to leave message on   voicemail  Preferred Call Back Phone Number?  [phone]

## 2021-11-09 DIAGNOSIS — E03.9 HYPOTHYROIDISM, UNSPECIFIED TYPE: ICD-10-CM

## 2021-11-09 DIAGNOSIS — Z00.01 ENCOUNTER FOR ANNUAL GENERAL MEDICAL EXAMINATION WITH ABNORMAL FINDINGS IN ADULT: ICD-10-CM

## 2021-11-09 DIAGNOSIS — M81.0 AGE-RELATED OSTEOPOROSIS WITHOUT CURRENT PATHOLOGICAL FRACTURE: ICD-10-CM

## 2021-11-09 DIAGNOSIS — E03.9 HYPOTHYROIDISM, UNSPECIFIED TYPE: Primary | ICD-10-CM

## 2021-11-09 LAB
ALBUMIN SERPL-MCNC: 4.7 G/DL (ref 3.5–5.2)
ALP BLD-CCNC: 60 U/L (ref 35–104)
ALT SERPL-CCNC: 14 U/L (ref 0–32)
ANION GAP SERPL CALCULATED.3IONS-SCNC: 18 MMOL/L (ref 7–16)
AST SERPL-CCNC: 23 U/L (ref 0–31)
BACTERIA: NORMAL /HPF
BASOPHILS ABSOLUTE: 0.05 E9/L (ref 0–0.2)
BASOPHILS RELATIVE PERCENT: 0.8 % (ref 0–2)
BILIRUB SERPL-MCNC: 0.6 MG/DL (ref 0–1.2)
BILIRUBIN URINE: NEGATIVE
BLOOD, URINE: NEGATIVE
BUN BLDV-MCNC: 10 MG/DL (ref 6–23)
CALCIUM SERPL-MCNC: 10.4 MG/DL (ref 8.6–10.2)
CHLORIDE BLD-SCNC: 105 MMOL/L (ref 98–107)
CHOLESTEROL, TOTAL: 232 MG/DL (ref 0–199)
CLARITY: CLEAR
CO2: 21 MMOL/L (ref 22–29)
COLOR: YELLOW
CREAT SERPL-MCNC: 0.7 MG/DL (ref 0.5–1)
EOSINOPHILS ABSOLUTE: 0.15 E9/L (ref 0.05–0.5)
EOSINOPHILS RELATIVE PERCENT: 2.5 % (ref 0–6)
GFR AFRICAN AMERICAN: >60
GFR NON-AFRICAN AMERICAN: >60 ML/MIN/1.73
GLUCOSE BLD-MCNC: 93 MG/DL (ref 74–99)
GLUCOSE URINE: NEGATIVE MG/DL
HCT VFR BLD CALC: 40.9 % (ref 34–48)
HDLC SERPL-MCNC: 105 MG/DL
HEMOGLOBIN: 13.7 G/DL (ref 11.5–15.5)
IMMATURE GRANULOCYTES #: 0.01 E9/L
IMMATURE GRANULOCYTES %: 0.2 % (ref 0–5)
KETONES, URINE: NEGATIVE MG/DL
LDL CHOLESTEROL CALCULATED: 117 MG/DL (ref 0–99)
LEUKOCYTE ESTERASE, URINE: ABNORMAL
LYMPHOCYTES ABSOLUTE: 1.82 E9/L (ref 1.5–4)
LYMPHOCYTES RELATIVE PERCENT: 30.4 % (ref 20–42)
MCH RBC QN AUTO: 32.9 PG (ref 26–35)
MCHC RBC AUTO-ENTMCNC: 33.5 % (ref 32–34.5)
MCV RBC AUTO: 98.3 FL (ref 80–99.9)
MONOCYTES ABSOLUTE: 0.69 E9/L (ref 0.1–0.95)
MONOCYTES RELATIVE PERCENT: 11.5 % (ref 2–12)
NEUTROPHILS ABSOLUTE: 3.26 E9/L (ref 1.8–7.3)
NEUTROPHILS RELATIVE PERCENT: 54.6 % (ref 43–80)
NITRITE, URINE: NEGATIVE
PDW BLD-RTO: 14.2 FL (ref 11.5–15)
PH UA: 8 (ref 5–9)
PLATELET # BLD: 262 E9/L (ref 130–450)
PMV BLD AUTO: 9.9 FL (ref 7–12)
POTASSIUM SERPL-SCNC: 5.3 MMOL/L (ref 3.5–5)
PROTEIN UA: NEGATIVE MG/DL
RBC # BLD: 4.16 E12/L (ref 3.5–5.5)
RBC UA: NORMAL /HPF (ref 0–2)
SODIUM BLD-SCNC: 144 MMOL/L (ref 132–146)
SPECIFIC GRAVITY UA: 1.01 (ref 1–1.03)
T4 TOTAL: 4.7 MCG/DL (ref 4.5–11.7)
TOTAL PROTEIN: 7.3 G/DL (ref 6.4–8.3)
TRIGL SERPL-MCNC: 48 MG/DL (ref 0–149)
TSH SERPL DL<=0.05 MIU/L-ACNC: 3.52 UIU/ML (ref 0.27–4.2)
UROBILINOGEN, URINE: 0.2 E.U./DL
VITAMIN D 25-HYDROXY: 43 NG/ML (ref 30–100)
VLDLC SERPL CALC-MCNC: 10 MG/DL
WBC # BLD: 6 E9/L (ref 4.5–11.5)
WBC UA: NORMAL /HPF (ref 0–5)

## 2021-11-12 ENCOUNTER — OFFICE VISIT (OUTPATIENT)
Dept: PRIMARY CARE CLINIC | Age: 61
End: 2021-11-12
Payer: COMMERCIAL

## 2021-11-12 VITALS
BODY MASS INDEX: 23.21 KG/M2 | DIASTOLIC BLOOD PRESSURE: 68 MMHG | WEIGHT: 131 LBS | HEIGHT: 63 IN | SYSTOLIC BLOOD PRESSURE: 126 MMHG | TEMPERATURE: 97.3 F | HEART RATE: 70 BPM | OXYGEN SATURATION: 100 %

## 2021-11-12 DIAGNOSIS — Z00.01 ENCOUNTER FOR ANNUAL GENERAL MEDICAL EXAMINATION WITH ABNORMAL FINDINGS IN ADULT: Primary | ICD-10-CM

## 2021-11-12 DIAGNOSIS — M81.0 AGE-RELATED OSTEOPOROSIS WITHOUT CURRENT PATHOLOGICAL FRACTURE: ICD-10-CM

## 2021-11-12 DIAGNOSIS — E03.9 HYPOTHYROIDISM, UNSPECIFIED TYPE: ICD-10-CM

## 2021-11-12 PROCEDURE — 99396 PREV VISIT EST AGE 40-64: CPT | Performed by: FAMILY MEDICINE

## 2021-11-12 RX ORDER — LEVOTHYROXINE SODIUM 88 UG/1
88 TABLET ORAL DAILY
Qty: 90 TABLET | Refills: 12 | Status: SHIPPED | OUTPATIENT
Start: 2021-11-12

## 2021-11-12 NOTE — PROGRESS NOTES
21  Name: Oval Organ    : 1960    Sex: female    Age: 62 y.o. Subjective:  Chief Complaint: Patient is here for annual check regarding wellness, thyroid    Feels well no chest pain or shortness of breath. Weight is down 5 pounds. Energy good. Exercising. Had colonoscopy      Review of Systems   Constitutional: Negative. HENT: Negative. Eyes: Negative. Respiratory: Negative. Cardiovascular: Negative. Gastrointestinal: Negative. Endocrine: Negative. Genitourinary: Negative. Musculoskeletal: Negative. Skin: Negative. Allergic/Immunologic: Negative. Neurological: Negative. Hematological: Negative. Psychiatric/Behavioral: Negative.           Current Outpatient Medications:     levothyroxine (SYNTHROID) 88 MCG tablet, Take 1 tablet by mouth daily, Disp: 90 tablet, Rfl: 12  No Known Allergies  Social History     Socioeconomic History    Marital status: Single     Spouse name: Not on file    Number of children: Not on file    Years of education: Not on file    Highest education level: Not on file   Occupational History    Not on file   Tobacco Use    Smoking status: Never Smoker    Smokeless tobacco: Never Used   Substance and Sexual Activity    Alcohol use: Yes     Comment: occassionally    Drug use: No    Sexual activity: Not on file   Other Topics Concern    Not on file   Social History Narrative            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 Nica Johnston 2021 due 10 years     Social Determinants of Health     Financial Resource Strain:     Difficulty of Paying Living Expenses: Not on file   Food Insecurity:     Worried About [address] [facility] in the Last Year: Not on file    Delgado of Food in the Last Year: Not on file   Transportation Needs:     Lack of Transportation (Medical): Not on file    Lack of Transportation (Non-Medical): Not on file   Physical Activity:     Days of Exercise per Week: Not on file    Minutes of Exercise per Session: Not on file   Stress:     Feeling of Stress : Not on file   Social Connections:     Frequency of Communication with Friends and Family: Not on file    Frequency of Social Gatherings with Friends and Family: Not on file    Attends Mosque Services: Not on file    Active Member of [address] [place] [address] or Organizations: Not on file    Attends Club or Organization Meetings: Not on file    Marital Status: Not on file   Intimate Partner Violence:     Fear of Current or Ex-Partner: Not on file    Emotionally Abused: Not on file    Physically Abused: Not on file    Sexually Abused: Not on file   Housing Stability:     Unable to Pay for Housing in the Last Year: Not on file    Number of [place] in the Last Year: Not on file    Unstable Housing in the Last Year: Not on file      Past Medical History:   Diagnosis Date    Amenorrhea     Hyperlipidemia     Hypothyroidism     Hypothyroidism     Osteopenia of hip     Polycystic ovaries      Family History   Problem Relation Age of Onset    Other Mother     Osteoporosis Mother     Arthritis Mother     Heart Disease Mother     Alzheimer's Disease Father       No past surgical history on file. Vitals:    11/12/21 1337   BP: 126/68   Pulse: 70   Temp: 97.3 °F (36.3 °C)   SpO2: 100%   Weight: 131 lb (59.4 kg)   Height: 5' 3\" (1.6 m)       Objective:    Physical Exam  Vitals reviewed. Constitutional:       Appearance: She is well-developed. HENT:      Head: Normocephalic. Eyes:      Pupils: Pupils are equal, round, and reactive to light. Cardiovascular:      Rate and Rhythm: Normal rate and regular rhythm.    Pulmonary: Effort: Pulmonary effort is normal.      Breath sounds: Normal breath sounds. Abdominal:      Palpations: Abdomen is soft. Musculoskeletal:         General: Normal range of motion. Cervical back: Normal range of motion. Skin:     General: Skin is warm. Neurological:      Mental Status: She is alert and oriented to person, place, and time. Psychiatric:         Behavior: Behavior normal.         David Shay was seen today for annual exam.    Diagnoses and all orders for this visit:    Encounter for annual general medical examination with abnormal findings in adult    Hypothyroidism, unspecified type  -     levothyroxine (SYNTHROID) 88 MCG tablet; Take 1 tablet by mouth daily        Comments: Her thyroid dose was increased last year. Her T4 is coming down TSH is waving upward but distal normal.  She feels well. Health maintenance issues. A great deal of time spent reviewing medications, diet, exercise, social issues. Also reviewing the chart before entering the room with patient and finishing charting after leaving patient's room. More than half of that time was spent face to face with the patient in counseling and coordinating care. Follow Up: Return in about 1 year (around 11/12/2022) for Lab Before.      Seen by:  Fany Lopes DO

## 2021-11-13 ASSESSMENT — PATIENT HEALTH QUESTIONNAIRE - PHQ9
1. LITTLE INTEREST OR PLEASURE IN DOING THINGS: 0
2. FEELING DOWN, DEPRESSED OR HOPELESS: 0
SUM OF ALL RESPONSES TO PHQ QUESTIONS 1-9: 0
SUM OF ALL RESPONSES TO PHQ9 QUESTIONS 1 & 2: 0

## 2022-03-23 ENCOUNTER — TELEPHONE (OUTPATIENT)
Dept: PRIMARY CARE CLINIC | Age: 62
End: 2022-03-23

## 2022-03-23 DIAGNOSIS — M25.552 LEFT HIP PAIN: Primary | ICD-10-CM

## 2022-03-23 NOTE — TELEPHONE ENCOUNTER
Patient calling requesting referral to [facility] outpatient for left hip pain no injury.    Fax: [phone]

## 2022-03-29 ENCOUNTER — TELEPHONE (OUTPATIENT)
Dept: PRIMARY CARE CLINIC | Age: 62
End: 2022-03-29

## 2022-03-29 DIAGNOSIS — M51.16 HERNIATION OF LUMBAR INTERVERTEBRAL DISC WITH RADICULOPATHY: ICD-10-CM

## 2022-03-29 DIAGNOSIS — M21.70 LEG LENGTH DISCREPANCY: Primary | ICD-10-CM

## 2022-03-29 NOTE — TELEPHONE ENCOUNTER
Patient asking if you could order a weight bearing (standing) xray from hip down into the foot, bilateral. Chiropractor is wanting to see the xray for possible leg length discrepancy or something else.  Would like to come here to our facility for xray

## 2022-03-29 NOTE — TELEPHONE ENCOUNTER
I put in the order without her we do not do that type of x-ray here. [facility]   They will see the order on the chart

## 2022-03-30 ENCOUNTER — HOSPITAL ENCOUNTER (OUTPATIENT)
Age: 62
Discharge: HOME | End: 2022-04-01

## 2022-03-30 ENCOUNTER — TELEPHONE (OUTPATIENT)
Dept: PRIMARY CARE CLINIC | Age: 62
End: 2022-03-30

## 2022-03-30 DIAGNOSIS — M25.552 LEFT HIP PAIN: ICD-10-CM

## 2022-03-30 DIAGNOSIS — M51.16 HERNIATION OF LUMBAR INTERVERTEBRAL DISC WITH RADICULOPATHY: Primary | ICD-10-CM

## 2022-03-30 DIAGNOSIS — M21.70 LEG LENGTH DISCREPANCY: ICD-10-CM

## 2022-03-30 NOTE — TELEPHONE ENCOUNTER
Dr. Reyes Class office at the [facility] is calling to let us know that they do not take the pt's insurance so they will not be able to accept the referral you placed for her

## 2022-03-31 ENCOUNTER — TELEPHONE (OUTPATIENT)
Dept: PRIMARY CARE CLINIC | Age: 62
End: 2022-03-31

## 2022-03-31 DIAGNOSIS — M51.16 HERNIATION OF LUMBAR INTERVERTEBRAL DISC WITH RADICULOPATHY: ICD-10-CM

## 2022-03-31 DIAGNOSIS — M21.70 LEG LENGTH DISCREPANCY: ICD-10-CM

## 2022-03-31 NOTE — TELEPHONE ENCOUNTER
Patient said she would go to whomever you recommend, but she does not want just to go to physical therapy. She wants someone to diagnosis what the problem is then go from there.   Referral to staff

## 2022-03-31 NOTE — TELEPHONE ENCOUNTER
Notify patient x-ray shows severe narrowing in the left hip. I would like to see her to review all the x-rays.

## 2022-03-31 NOTE — TELEPHONE ENCOUNTER
Let patient know.   [facility] she wants [facility] to

## 2022-04-04 ENCOUNTER — TELEPHONE (OUTPATIENT)
Dept: PRIMARY CARE CLINIC | Age: 62
End: 2022-04-04

## 2022-04-04 ENCOUNTER — OFFICE VISIT (OUTPATIENT)
Dept: PRIMARY CARE CLINIC | Age: 62
End: 2022-04-04
Payer: COMMERCIAL

## 2022-04-04 VITALS
DIASTOLIC BLOOD PRESSURE: 78 MMHG | TEMPERATURE: 97 F | BODY MASS INDEX: 24.1 KG/M2 | WEIGHT: 136 LBS | SYSTOLIC BLOOD PRESSURE: 126 MMHG | HEIGHT: 63 IN

## 2022-04-04 DIAGNOSIS — M15.9 PRIMARY OSTEOARTHRITIS INVOLVING MULTIPLE JOINTS: ICD-10-CM

## 2022-04-04 DIAGNOSIS — M16.12 PRIMARY OSTEOARTHRITIS OF LEFT HIP: Primary | ICD-10-CM

## 2022-04-04 PROCEDURE — 99213 OFFICE O/P EST LOW 20 MIN: CPT | Performed by: FAMILY MEDICINE

## 2022-04-04 PROCEDURE — 3017F COLORECTAL CA SCREEN DOC REV: CPT | Performed by: FAMILY MEDICINE

## 2022-04-04 PROCEDURE — G8420 CALC BMI NORM PARAMETERS: HCPCS | Performed by: FAMILY MEDICINE

## 2022-04-04 PROCEDURE — 1036F TOBACCO NON-USER: CPT | Performed by: FAMILY MEDICINE

## 2022-04-04 PROCEDURE — G8428 CUR MEDS NOT DOCUMENT: HCPCS | Performed by: FAMILY MEDICINE

## 2022-04-04 ASSESSMENT — PATIENT HEALTH QUESTIONNAIRE - PHQ9
SUM OF ALL RESPONSES TO PHQ QUESTIONS 1-9: 0
SUM OF ALL RESPONSES TO PHQ QUESTIONS 1-9: 0
1. LITTLE INTEREST OR PLEASURE IN DOING THINGS: 0
SUM OF ALL RESPONSES TO PHQ9 QUESTIONS 1 & 2: 0
SUM OF ALL RESPONSES TO PHQ QUESTIONS 1-9: 0
2. FEELING DOWN, DEPRESSED OR HOPELESS: 0
SUM OF ALL RESPONSES TO PHQ QUESTIONS 1-9: 0

## 2022-04-04 NOTE — TELEPHONE ENCOUNTER
Per Chandana [facility] leg lengths were ordered but not done. Patient will have to return.   Need order for CT Leg Lengths

## 2022-04-04 NOTE — PROGRESS NOTES
22  Name: Mis Hodges    : 1960    Sex: female    Age: 62 y.o. Subjective:  Chief Complaint: Patient is here for  Left hip pain      Left hip pain  And prox thigh left  She saw mt  And a nd  adsved leg lemgth---[place]  X ray  Di dnto do leg legnth but   xra  With  Severe oa left hip  Limps on left leg      Review of Systems   Constitutional: Negative. HENT: Negative. Eyes: Negative. Respiratory: Negative. Cardiovascular: Negative. Gastrointestinal: Negative. Endocrine: Negative. Genitourinary: Negative. Musculoskeletal: Negative. See  hpi   Skin: Negative. Allergic/Immunologic: Negative. Neurological: Negative. Hematological: Negative. Psychiatric/Behavioral: Negative.           Current Outpatient Medications:     levothyroxine (SYNTHROID) 88 MCG tablet, Take 1 tablet by mouth daily, Disp: 90 tablet, Rfl: 12  No Known Allergies  Social History     Socioeconomic History    Marital status: Single     Spouse name: Not on file    Number of children: Not on file    Years of education: Not on file    Highest education level: Not on file   Occupational History    Not on file   Tobacco Use    Smoking status: Never Smoker    Smokeless tobacco: Never Used   Substance and Sexual Activity    Alcohol use: Yes     Comment: occassionally    Drug use: No    Sexual activity: Not on file   Other Topics Concern    Not on file   Social History Narrative            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 Wilkins Crimes 2021 due 10 years     Social Determinants of Health     Financial Resource Strain:     Difficulty of Paying Living Expenses: Not on file   Food Insecurity:     Worried About [address] in the Last Year: Not on file    Delgado of Food in the Last Year: Not on file   Transportation Needs:     Lack of Transportation (Medical): Not on file    Lack of Transportation (Non-Medical): Not on file   Physical Activity:     Days of Exercise per Week: Not on file    Minutes of Exercise per Session: Not on file   Stress:     Feeling of Stress : Not on file   Social Connections:     Frequency of Communication with Friends and Family: Not on file    Frequency of Social Gatherings with Friends and Family: Not on file    Attends Synagogue Services: Not on file    Active Member of [address] [place] [address] or Organizations: Not on file    Attends Club or Organization Meetings: Not on file    Marital Status: Not on file   Intimate Partner Violence:     Fear of Current or Ex-Partner: Not on file    Emotionally Abused: Not on file    Physically Abused: Not on file    Sexually Abused: Not on file   Housing Stability:     Unable to Pay for Housing in the Last Year: Not on file    Number of [place] in the Last Year: Not on file    Unstable Housing in the Last Year: Not on file      Past Medical History:   Diagnosis Date    Amenorrhea     Hyperlipidemia     Hypothyroidism     Hypothyroidism     Osteopenia of hip     Polycystic ovaries      Family History   Problem Relation Age of Onset    Other Mother     Osteoporosis Mother     Arthritis Mother     Heart Disease Mother     Alzheimer's Disease Father       No past surgical history on file. Vitals:    04/04/22 1341   BP: 126/78   Temp: 97 °F (36.1 °C)   TempSrc: Infrared   Weight: 136 lb (61.7 kg)       Objective:    Physical Exam  Vitals reviewed. Constitutional:       Appearance: She is well-developed. HENT:      Head: Normocephalic. Eyes:      Pupils: Pupils are equal, round, and reactive to light.    Cardiovascular:      Rate and Rhythm: Normal rate and regular rhythm. Pulmonary:      Effort: Pulmonary effort is normal.      Breath sounds: Normal breath sounds. Abdominal:      Palpations: Abdomen is soft. Musculoskeletal:      Cervical back: Normal range of motion. Comments: Marked dec rom left hip wit hpain   Skin:     General: Skin is warm. Neurological:      Mental Status: She is alert and oriented to person, place, and time. Psychiatric:         Behavior: Behavior normal.         Zhou Leonardo was seen today for results. Diagnoses and all orders for this visit:    Primary osteoarthritis of left hip  -     LOC - Vennie Osler, MD, Orthopaedics, [facility]    Primary osteoarthritis involving multiple joints        Comments:  appt   Dr Katiuska Echeverria       I educated the patient about all medications. Make sure they were correct and educated  on the risk associated with missing meds or taking them incorrectly. A list of medications is being sent home with patient today. I informed patient about the risk associated with noncompliance of medication and taking medications incorrectly. Appropriate follow-up with myself and all specialist.  Encourage family members to take active role in assisting with medications and medical care. If any confusion should develop to notify my office immediately to avoid risk of worsening medical condition    A great deal of time spent reviewing medications, diet, exercise, social issues. Also reviewing the chart before entering the room with patient and finishing charting after leaving patient's room. More than half of that time was spent face to face with the patient in counseling and coordinating care.       Follow Up: Return for Reg Appt, See Referral.     Seen by:  Ramez Eid DO

## 2022-04-06 ENCOUNTER — TELEPHONE (OUTPATIENT)
Dept: PRIMARY CARE CLINIC | Age: 62
End: 2022-04-06

## 2022-04-06 DIAGNOSIS — M16.12 PRIMARY OSTEOARTHRITIS OF LEFT HIP: Primary | ICD-10-CM

## 2022-04-06 NOTE — TELEPHONE ENCOUNTER
----- Message from Michelle Toussaint sent at 4/6/2022  3:37 PM EDT -----  Subject: Message to Provider    QUESTIONS  Information for Provider? Patient would like to hold off on the referral   she requested this morning. She wanna wait for a lil bit.  ---------------------------------------------------------------------------  --------------  CALL BACK INFO  What is the best way for the office to contact you? OK to leave message on   voicemail  Preferred Call Back Phone Number?  [phone]  ---------------------------------------------------------------------------  --------------  SCRIPT ANSWERS  undefined

## 2022-04-06 NOTE — TELEPHONE ENCOUNTER
Notify the patient that Dr. Jorge A Diamond does not accept her insurance. I put a referral in for Dr. Jv Martínez who is an excellent orthopedic surgeon with [facility].

## 2022-04-07 ENCOUNTER — TELEPHONE (OUTPATIENT)
Dept: PRIMARY CARE CLINIC | Age: 62
End: 2022-04-07

## 2022-04-07 DIAGNOSIS — M16.12 PRIMARY OSTEOARTHRITIS OF LEFT HIP: Primary | ICD-10-CM

## 2022-04-07 NOTE — TELEPHONE ENCOUNTER
Need order for CT Scanogram.  Per JOSEPH pt no longer needs.   Lianet @ [facility] notified

## 2022-04-07 NOTE — TELEPHONE ENCOUNTER
----- Message from Yongqiana Brandan sent at 4/7/2022 11:46 AM EDT -----  Subject: Referral Request    QUESTIONS   Reason for referral request? Pt found her own Orthopedic doctor and needs   referral faxed to his office. Dr. Rosalino Scott Fax # [phone]   Has the physician seen you for this condition before? No   Preferred Specialist (if applicable)? Do you already have an appointment scheduled? No  Additional Information for Provider? Please send referral and call pt back   when it has been sent so she can schedule an appt.   ---------------------------------------------------------------------------  --------------  CALL BACK INFO  What is the best way for the office to contact you? OK to leave message on   voicemail  Preferred Call Back Phone Number? [phone]  ---------------------------------------------------------------------------  --------------  SCRIPT ANSWERS  Relationship to Patient?  Self

## 2022-04-26 ENCOUNTER — TELEPHONE (OUTPATIENT)
Dept: ORTHOPEDIC SURGERY | Age: 62
End: 2022-04-26

## 2022-04-26 NOTE — TELEPHONE ENCOUNTER
Patient called in, appointment rescheduled. Patient stated this is for her Left hip and she has not seen any other orthopedic provider for it yet.     Future Appointments   Date Time Provider Ana Macias   5/3/2022  2:45 PM Jennifer Angeles MD [facility]   11/18/2022  2:00 PM DO MARCIO Toussaint [facility]

## 2022-04-26 NOTE — TELEPHONE ENCOUNTER
Call placed to patient in an attempt to schedule an appointment per Dr. Rock Garcia. No answer, appointment tentatively et at this time. A call back to office requested. Need to confirm/reschedule appointment, know which knee we will be seeing patient for and if this is a second opinion(notes in patient's chart she was seeing Dr. Radha Weldon).        Future Appointments   Date Time Provider Ana Macias   4/26/2022  1:30 PM Nelli Toro MD [facility]   11/18/2022  2:00 PM DO MARCIO Duong [facility]

## 2022-05-03 ENCOUNTER — OFFICE VISIT (OUTPATIENT)
Dept: ORTHOPEDIC SURGERY | Age: 62
End: 2022-05-03
Payer: COMMERCIAL

## 2022-05-03 ENCOUNTER — TELEPHONE (OUTPATIENT)
Dept: ORTHOPEDIC SURGERY | Age: 62
End: 2022-05-03

## 2022-05-03 VITALS — HEIGHT: 62 IN | BODY MASS INDEX: 23.92 KG/M2 | WEIGHT: 130 LBS

## 2022-05-03 DIAGNOSIS — M16.12 PRIMARY LOCALIZED OSTEOARTHRITIS OF LEFT HIP: ICD-10-CM

## 2022-05-03 DIAGNOSIS — G57.12 MERALGIA PARESTHETICA OF LEFT SIDE: ICD-10-CM

## 2022-05-03 DIAGNOSIS — M19.041 PRIMARY OSTEOARTHRITIS OF BOTH HANDS: Chronic | ICD-10-CM

## 2022-05-03 DIAGNOSIS — M25.552 LEFT HIP PAIN: Primary | ICD-10-CM

## 2022-05-03 DIAGNOSIS — M17.11 PRIMARY OSTEOARTHRITIS OF RIGHT KNEE: Chronic | ICD-10-CM

## 2022-05-03 DIAGNOSIS — M19.042 PRIMARY OSTEOARTHRITIS OF BOTH HANDS: Chronic | ICD-10-CM

## 2022-05-03 PROCEDURE — 1036F TOBACCO NON-USER: CPT | Performed by: ORTHOPAEDIC SURGERY

## 2022-05-03 PROCEDURE — G8427 DOCREV CUR MEDS BY ELIG CLIN: HCPCS | Performed by: ORTHOPAEDIC SURGERY

## 2022-05-03 PROCEDURE — 99204 OFFICE O/P NEW MOD 45 MIN: CPT | Performed by: ORTHOPAEDIC SURGERY

## 2022-05-03 PROCEDURE — 99212 OFFICE O/P EST SF 10 MIN: CPT

## 2022-05-03 PROCEDURE — 3017F COLORECTAL CA SCREEN DOC REV: CPT | Performed by: ORTHOPAEDIC SURGERY

## 2022-05-03 PROCEDURE — G8420 CALC BMI NORM PARAMETERS: HCPCS | Performed by: ORTHOPAEDIC SURGERY

## 2022-05-03 RX ORDER — GABAPENTIN 100 MG/1
100 CAPSULE ORAL NIGHTLY
Qty: 30 CAPSULE | Refills: 0 | Status: SHIPPED | OUTPATIENT
Start: 2022-05-03 | End: 2023-09-20

## 2022-05-03 NOTE — PROGRESS NOTES
New Patient Orthopaedic Progress Note    Roxann Kapadia is a 62 y.o. female, her YOB: 1960 with the following history as recorded in [facility]:      Patient Active Problem List    Diagnosis Date Noted    Left lower quadrant abdominal pain 01/29/2021    Left flank pain 01/29/2021    Kidney stone 01/29/2021    Acute bilateral low back pain without sciatica 01/29/2021    Encounter for annual general medical examination with abnormal findings in adult 10/30/2020    Osteoporosis without current pathological fracture 10/25/2019    Primary osteoarthritis of both hands 10/25/2019    Hypothyroidism 01/30/2017    Vitamin D deficiency 01/30/2017    Primary osteoarthritis of right knee 03/01/2016     Current Outpatient Medications   Medication Sig Dispense Refill    gabapentin (NEURONTIN) 100 MG capsule Take 1 capsule by mouth at bedtime for 30 days. Take 1 to 2 capsules nightly as needed. 30 capsule 0    levothyroxine (SYNTHROID) 88 MCG tablet Take 1 tablet by mouth daily 90 tablet 12     No current facility-administered medications for this visit. Allergies: Patient has no known allergies. Past Medical History:   Diagnosis Date    Amenorrhea     Hyperlipidemia     Hypothyroidism     Hypothyroidism     Osteopenia of hip     Polycystic ovaries      No past surgical history on file. Family History   Problem Relation Age of Onset    Other Mother     Osteoporosis Mother     Arthritis Mother     Heart Disease Mother     Alzheimer's Disease Father      Social History     Tobacco Use    Smoking status: Never Smoker    Smokeless tobacco: Never Used   Substance Use Topics    Alcohol use: Yes     Comment: occassionally                             Chief Complaint   Patient presents with    Hip Pain     left       SUBJECTIVE: Chief complaint is really left hip pain, the pain is worse at night and keeps her awake.     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 Approximately 6 months ago she began complaining of left hip pain after a rather incidental injury. The pain was in her groin. She is also developed severe pain at night and keeps her awake it is primarily involved in the anterior thigh only. She has still been able to put her shoes and socks on the left side but it is a little more difficult now. She does get pain with movement. She is tried taking nonsteroidal anti-inflammatories including Advil and meloxicam which have really have not helped at all. She had a previous work-up at [facility] and standing hip to ankle x-rays were taken of both lower extremities. She was told she had arthritis in her left hip and her right knee. She was also told she may have a leg length inequality. Review of Systems   Constitutional: Negative for fever, chills, diaphoresis, appetite change and fatigue. HENT: Negative for dental issues, hearing loss and tinnitus. Negative for congestion, sinus pressure, sneezing, sore throat. Negative for headache. Eyes: Negative for visual disturbance, blurred and double vision. Negative for pain, discharge, redness and itching  Respiratory: Negative for cough, shortness of breath and wheezing. Cardiovascular: Negative for chest pain, palpitations and leg swelling. No dyspnea on exertion   Gastrointestinal:   Negative for nausea, vomiting, abdominal pain, diarrhea, constipation  or black or bloody. Hematologic\Lymphatic:  negative for bleeding, petechiae,   Genitourinary: Negative for hematuria and difficulty urinating. Musculoskeletal: Negative for neck pain and stiffness. Negative for back pain, see HPI  Skin: Negative for pallor, rash and wound. Neurological: Negative for dizziness, tremors, seizures, weakness, light-headedness, no TIA or stroke symptoms. No numbness and headaches. Psychiatric/Behavioral: Negative.         OBJECTIVE:      Physical Examination:   General appearance: alert, well appearing, and in no distress,  normal appearing weight. No visible signs of trauma   Mental status: alert, oriented to person, place, and time, normal mood, behavior, speech, dress, motor activity, and thought processes  Abdomen: soft, nondistended  Resp:   resp easy and unlabored, no audible wheezes note, normal symmetrical expansion of both hemithoraces  Cardiac: distal pulses palpable, skin and extremities well perfused  Neurological: alert, oriented X3, normal speech, no focal findings or movement disorder noted, motor and sensory grossly normal bilaterally, normal muscle tone, no tremors, strength 5/5, normal gait and station  HEENT: normochephalic atraumatic, external ears and eyes normal, sclera normal, neck supple, no nasal discharge. Extremities:   peripheral pulses normal, no edema, redness or tenderness in the calves   Skin: normal coloration, no rashes or open wounds, no suspicious skin lesions noted  Psych: Affect euthymic   Musculoskeletal:   Extremity:  Patient walks with a minimally to moderate antalgic gait without the use of a cane or walker. She has almost 15 degrees to 20 degrees of internal rotation of the left hip she has almost 60 degrees of abduction she has 130 degrees of flexion and. She does have some pain with range of motion of the left hip but certainly her range of motion is not bad. Her leg lengths are equal as far as I can measure on the table. Does have some tenderness in the left groin and some over the inguinal ligament and some in the left thigh. Also has tenderness of the lumbar sacral spine. She has 5 out of 5 motor strength otherwise no atrophy plus dorsalis pedis and posterior tibial    Ht 5' 2\" (1.575 m)   Wt 130 lb (59 kg)   BMI 23.78 kg/m²      XR: 5/3/22 I have today standing hip to ankle x-rays of both lower extremity. Her leg lengths are equal.  She has moderate osteoarthritis in the left hip.   She has a little bit of acetabular insufficiency in the left hip with the femoral head somewhat extruded on the left side. She also has moderate to severe arthritis of right knee. Reviewed prior testing:      ASSESSMENT: Patient has moderate osteoarthritis of left hip and has excellent range of motion. Also has this pain at night in her left anterior thigh which may represent impingement of the lateral femoral cutaneous nerve. Diagnosis Orders   1. Left hip pain  Brittanie Zapata MD, Sports Medicine, [facility]    gabapentin (NEURONTIN) 100 MG capsule       Discussion: I had a long discussion with the patient today in the office. I do believe she has moderate to severe osteoarthritis of the left hip but her range of motion is very good. My recommendation is that we do a intra-articular injection in the left hip per sports medicine under ultrasound guidance. I think this would give us some understanding of how much of the pain is actually coming from her hip versus her back versus her lateral femoral cutaneous nerve. I have also given her a trial dose of gabapentin 100 mg to take at night to see if we can eliminate the anterior thigh pain. This would indicate that some of her pain is coming from impingement of the lateral femoral cutaneous nerve. I will see her back in 1 month and reevaluate. If both treatments fail then I think total hip replacement would be an alternative. Electronically signed by Asia Talley MD on 5/3/2022 at 3:41 PM  Note: This report was completed using [employer] voiced recognition software. Every effort has been made to ensure accuracy; however, inadvertent computerized transcription errors may be present.

## 2022-05-03 NOTE — TELEPHONE ENCOUNTER
Pt called in stating [facility] is unable to fill her script because there is two different directions on the bottle. Ying Banuelos needs the office to call them. Thank you. Unable to reach office due to pt care.

## 2022-05-03 NOTE — PROGRESS NOTES
Patient here as a new patient for left hip pain. Patient had XR completed with [facility] and disc was provided for review. Patient states that severely in the last three months now, she feels like something pulled in the leg and muscle weakness. She states that when she is laying down she feels a burning sensation mostly in the groin of the left side. Patient denies any previous surgery to the left hip.             Electronically signed by Gordo Vaughn MA on 5/3/2022 at 3:02 PM

## 2022-05-03 NOTE — PROGRESS NOTES
Gabapentin 100-200mg nightly PRN sent to pharmacy. Controlled Substance Monitoring:    Acute and Chronic Pain Monitoring:   RX Monitoring 5/3/2022   Periodic Controlled Substance Monitoring No signs of potential drug abuse or diversion identified.

## 2022-05-03 NOTE — PATIENT INSTRUCTIONS
Dr. Dennise Giron  Ultrasound guided hip steroid injection    [address] [facility]: [phone]         FOLLOW UP IN 4 WEEKS    Please call the office at [phone] or send PlayS[employer] message to providers sooner with any questions or concerns  Strongly recommend all of our patients sign up for PlayS[employer] in order to have direct communication VIA PlaySquare NATALEE with our clinic staff.

## 2022-05-04 NOTE — TELEPHONE ENCOUNTER
Radha Mendez spoke to pharmacy yesterday and provided information needed. Call placed to patient. No answer, left voicemail.

## 2022-05-09 ENCOUNTER — OFFICE VISIT (OUTPATIENT)
Dept: SPORTS MEDICINE | Age: 62
End: 2022-05-09
Payer: COMMERCIAL

## 2022-05-09 VITALS — HEIGHT: 62 IN | BODY MASS INDEX: 23.92 KG/M2 | WEIGHT: 130 LBS

## 2022-05-09 DIAGNOSIS — M16.12 PRIMARY OSTEOARTHRITIS OF LEFT HIP: ICD-10-CM

## 2022-05-09 DIAGNOSIS — M25.552 LEFT HIP PAIN: Primary | ICD-10-CM

## 2022-05-09 PROCEDURE — G8420 CALC BMI NORM PARAMETERS: HCPCS | Performed by: FAMILY MEDICINE

## 2022-05-09 PROCEDURE — 3017F COLORECTAL CA SCREEN DOC REV: CPT | Performed by: FAMILY MEDICINE

## 2022-05-09 PROCEDURE — G8427 DOCREV CUR MEDS BY ELIG CLIN: HCPCS | Performed by: FAMILY MEDICINE

## 2022-05-09 PROCEDURE — 99204 OFFICE O/P NEW MOD 45 MIN: CPT | Performed by: FAMILY MEDICINE

## 2022-05-09 PROCEDURE — 1036F TOBACCO NON-USER: CPT | Performed by: FAMILY MEDICINE

## 2022-05-09 PROCEDURE — 20611 DRAIN/INJ JOINT/BURSA W/US: CPT | Performed by: FAMILY MEDICINE

## 2022-05-09 RX ORDER — LIDOCAINE HYDROCHLORIDE 10 MG/ML
5 INJECTION, SOLUTION EPIDURAL; INFILTRATION; INTRACAUDAL; PERINEURAL ONCE
Status: COMPLETED | OUTPATIENT
Start: 2022-05-09 | End: 2022-05-09

## 2022-05-09 RX ORDER — TRIAMCINOLONE ACETONIDE 40 MG/ML
40 INJECTION, SUSPENSION INTRA-ARTICULAR; INTRAMUSCULAR ONCE
Status: COMPLETED | OUTPATIENT
Start: 2022-05-09 | End: 2022-05-09

## 2022-05-09 RX ADMIN — LIDOCAINE HYDROCHLORIDE 5 ML: 10 INJECTION, SOLUTION EPIDURAL; INFILTRATION; INTRACAUDAL; PERINEURAL at 16:51

## 2022-05-09 RX ADMIN — TRIAMCINOLONE ACETONIDE 40 MG: 40 INJECTION, SUSPENSION INTRA-ARTICULAR; INTRAMUSCULAR at 16:52

## 2022-05-09 NOTE — PROGRESS NOTES
[facility]  PRIMARY CARE SPORTS MEDICINE  DATE OF VISIT : 2022    Patient : Courtney Senior  Age : 62 y.o.  : 1960  MRN : [id number]   ______________________________________________________________________    Chief Complaint :   Chief Complaint   Patient presents with    Hip Pain     (L) hip pain. Pain has been ongoing since 2022. Patient has been doing at home rehab with little relief. HPI : Courtney Senior is 62 y.o. female who presented to the clinic today for evaluation of left hip pain. Onset of the symptoms was several months ago, with no known mechanism of injury. Current symptoms include pain located in the left groin. Patient denies mechanical symptoms. Pain is aggravated by any weight bearing especially deep squats. Evaluation to date: XRs . Treatment to date: avoidance of offending activity and OTC analgesics which are not very effective. Past Medical History :  Past Medical History:   Diagnosis Date    Amenorrhea     Hyperlipidemia     Hypothyroidism     Hypothyroidism     Osteopenia of hip     Polycystic ovaries      No past surgical history on file. Allergies :   No Known Allergies    Medication List :    Current Outpatient Medications   Medication Sig Dispense Refill    gabapentin (NEURONTIN) 100 MG capsule Take 1 capsule by mouth at bedtime for 30 days. Take 1 to 2 capsules nightly as needed. 30 capsule 0    levothyroxine (SYNTHROID) 88 MCG tablet Take 1 tablet by mouth daily 90 tablet 12     No current facility-administered medications for this visit.      ______________________________________________________________________    Physical Exam :    Vitals: Ht 5' 2\" (1.575 m)   Wt 130 lb (59 kg) Comment: per pt. BMI 23.78 kg/m²   General Appearance: Nontoxic, awake, alert, and in no acute distress. Chest wall/Lung: Respirations regular and unlabored. No cyanosis. Heart: RRR, distal pulses intact. Neurologic: Alert&Oriented x3. Sensation grossly intact.  No focal motor deficits detected. Musculokeletal: LEFT Hip:  ROM: Forward flexion to 120, IR 25, ER 40  TTP: ( - ) Greater trochanter, ( - ) Hip flexors, ( - ) SI Joint  Special tests: ( - ) Logroll, ( + ) FADIR, ( + ) STEVEN, ( - ) SLR, ( - ) Stinchfield, ( - ) Pelvic shear  ______________________________________________________________________    Assessment & Plan :    1. Left hip pain  2. Primary osteoarthritis of left hip  Patient presents to the office today for evaluation of left hip pain. History, physical exam and imaging are consistent with left hip osteoarthritis. Treatment options discussed with patient in the office today including activity modification, oral anti-inflammatories, physical therapy, injection options, advancing in the form of a MRI and referral to an orthopedic surgeon for discussion of surgical opinion. Patient wishes to proceed with conservative treatment in the form of an ultrasound-guided corticosteroid injection which was performed in the office today, please see separate procedure note for further details. Patient will follow up in 6 weeks for reevaluation symptoms and consider escalation therapy should symptoms persist.  Patient is agreeable with above plan all questions and concerns were addressed in the office today. - US ARTHR/ASP/INJ MAJOR JNT/BURSA LEFT; Future  - lidocaine PF 1 % injection 5 mL  - triamcinolone acetonide (KENALOG-40) injection 40 mg    Return to Office: Return in about 6 weeks (around 6/20/2022) for injection follow up.     Carlito Singh MD

## 2022-05-09 NOTE — PROGRESS NOTES
PROCEDURE NOTE:    DIAGNOSIS      LEFT hip pain. PROCEDURE     Ultrasound-guided LEFT femoroacetabular?(hip) joint corticosteroid injection. PROCEDURAL PAUSE     Procedural pause conducted to verify: ?correct patient identity, procedure to be performed, and as applicable, correct side and site, correct patient position, and availability of implants, special equipment, or special requirements. PROCEDURE DETAILS     The procedure was carried out under sterile technique. Patient Position: ? Supine. Localization Process: ? The hip joint was evaluated under ultrasound prior to starting the procedure. ? The neurovascular bundle (femoral nerve, artery, vein) was identified under ultrasound prior to starting the procedure. ? The skin was prepped with Betadine and Alcohol. Approach: ? In-plane. Local Anesthesia: Under live ultrasound guidance, local anesthesia was obtained with vapocoolant cold spray and 2 cc of 1% lidocaine using a 25-gauge 2-inch needle advanced from an in-plane approach to the hip joint capsule at the femoral head-neck junction. ?     Injection/Aspiration: ?A 22-gauge 3-1/2-inch needle was advanced from an in-plane approach into the hip joint. ?Os was contacted at the femoral head-neck junction. ? After visualization of the needle tip in the target area and negative aspiration for blood, a mixture of 3 cc of 1% lidocaine and 1 cc of Kenalog (40 mg/cc) was injected into the hip joint with excellent sonographic flow. Images of procedure were permanently recorded. Postprocedure Care: ? The patient will avoid soaking the hip under water for two days and avoid heavy exertion with the hip. ?The patient will contact me with any problems related to the injection. PATIENT EDUCATION     Ready to learn, no apparent learning barriers were identified; learning preferences include listening. ? Explained diagnosis and treatment plan; patient expressed understanding of the content.      INFORMED CONSENT     Following denial of allergy and review of potential side effects and complications including but not necessarily limited to infection, allergic reaction, local tissue breakdown, systemic effects of corticosteroids, elevation of blood glucose, injury to soft tissue and/or nerves, and seizure, the patient indicated their understanding and agreed to proceed. ? Discussed the risks, benefits, alternatives, and the necessity of other members of the healthcare team participating in the procedure. ?All questions answered and consent given. All questions and concerns were addressed and consent was verbalized by the patient. FOLLOW UP    Follow up in 6-8 weeks.     Electronically signed by Yamel Chapin MD on 5/9/2022 at 3:58 PM

## 2022-05-26 ENCOUNTER — TELEPHONE (OUTPATIENT)
Dept: ADMINISTRATIVE | Age: 62
End: 2022-05-26

## 2022-05-26 NOTE — TELEPHONE ENCOUNTER
Call back to pt to schedule 1 mo f/u-pt stated JKS advised to f/u 1 mo. Pt also stated lt hip pain aggravated by doing yard work the weekend of 5/21/22.  Scheduled appt   Future Appointments   Date Time Provider Ana Colleen   6/7/2022  1:45 PM Jasmin Bauman MD [facility]   6/20/2022  1:30 PM Godwin Miller MD [facility]   11/18/2022  2:00 PM DO MARCIO Perez [facility]

## 2022-05-31 DIAGNOSIS — M25.552 LEFT HIP PAIN: Primary | ICD-10-CM

## 2022-06-07 ENCOUNTER — OFFICE VISIT (OUTPATIENT)
Dept: ORTHOPEDIC SURGERY | Age: 62
End: 2022-06-07
Payer: COMMERCIAL

## 2022-06-07 ENCOUNTER — HOSPITAL ENCOUNTER (OUTPATIENT)
Dept: GENERAL RADIOLOGY | Age: 62
Discharge: HOME | End: 2022-06-09
Payer: COMMERCIAL

## 2022-06-07 VITALS — HEIGHT: 62 IN | BODY MASS INDEX: 23.55 KG/M2 | WEIGHT: 128 LBS

## 2022-06-07 DIAGNOSIS — M25.552 LEFT HIP PAIN: ICD-10-CM

## 2022-06-07 DIAGNOSIS — M16.12 PRIMARY LOCALIZED OSTEOARTHRITIS OF LEFT HIP: Primary | ICD-10-CM

## 2022-06-07 PROCEDURE — G8427 DOCREV CUR MEDS BY ELIG CLIN: HCPCS | Performed by: PHYSICIAN ASSISTANT

## 2022-06-07 PROCEDURE — 3017F COLORECTAL CA SCREEN DOC REV: CPT | Performed by: PHYSICIAN ASSISTANT

## 2022-06-07 PROCEDURE — 99213 OFFICE O/P EST LOW 20 MIN: CPT | Performed by: PHYSICIAN ASSISTANT

## 2022-06-07 PROCEDURE — 1036F TOBACCO NON-USER: CPT | Performed by: PHYSICIAN ASSISTANT

## 2022-06-07 PROCEDURE — G8420 CALC BMI NORM PARAMETERS: HCPCS | Performed by: PHYSICIAN ASSISTANT

## 2022-06-07 PROCEDURE — 99212 OFFICE O/P EST SF 10 MIN: CPT

## 2022-06-07 NOTE — PROGRESS NOTES
Chief Complaint   Patient presents with    Hip Pain     left       SUBJECTIVE: Pili Ledesma is a 61-year-old female who presents for follow-up for left hip pain. She was seen for initial consultation approximately 5 weeks ago. She did have an ultrasound-guided left hip injection with sports medicine which did help her. 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 She states that she is very active. Approximately 7 months ago she began complaining of left hip pain after a rather incidental injury. The pain was in her groin. He does have some pain at nighttime in the hip as well as when she is walking longer distances. Patient states that she was told she has a hip labrum tear. She has known moderately severe left hip OA. Denies numbness, tingling or paresthesias. No other orthopedic complaints at this time. Review of Systems -   General ROS: negative for - chills, fatigue, fever or night sweats  Respiratory ROS: no cough, shortness of breath, or wheezing  Cardiovascular ROS: no chest pain or dyspnea on exertion  Gastrointestinal ROS: no abdominal pain, change in bowel habits, or black or bloody stools  Genitourinary: no hematuria, dysuria, or incontinence   Musculoskeletal ROS:see above  Neurological ROS: no TIA or stroke symptoms     OBJECTIVE:   Alert and oriented X 3, no acute distress, respirations easy and unlabored with no audible wheezes, skin warm and dry, speech and dress appropriate for noted age, affect euthymic. Extremity:  Left Lower Extremity  Skin clean dry and intact, without signs of infection  no edema noted  Compartments supple throughout thigh and leg  Calf supple and nontender  Demonstrates active knee flexion/extension, ankle plantar/dorsiflexion/great toe extension. States sensation intact to touch in sural/deep peroneal/superficial peroneal/saphenous/posterior tibial nerve distributions to foot/ankle.    Palpable dorsalis pedis and posterior tibialis pulses, cap refill brisk in toes, foot warm/perfused. Ambulates with no assistive devices, no limp. Moderate left hip pain with approximately 40 degrees external rotation and approximately 20 degrees internal rotation  He does have some groin pain with seated straight leg raise on the left approximately 70 degrees without distal pain. XR: 6/7/22   Not taken today. Ht 5' 2\" (1.575 m)   Wt 128 lb (58.1 kg)   BMI 23.41 kg/m²     ASSESSMENT:   Diagnosis Orders   1. Primary localized osteoarthritis of left hip       PLAN:  Patient seen and examined by Dr. Tricia Rivas. Had a lengthy discussion with the patient today regarding surgical versus nonsurgical options. At this point, she states that the ultrasound-guided left hip injection with sports medicine did give her good relief and she may be interested in possibly another injection 3 to 4 months down the road if her symptoms return. Discussed that she does likely have a labral tear in the hip however really do not feel it is necessary to get an MRI which she inquired about as it would likely not change the course of treatment of her hip. She does have moderately severe left hip OA. Again discussed hip replacement which she would like to hold off on as she states she is still very active and the recent hip injection helped her. We feel that this is reasonable. At this point, recommend for follow-up in around 4 months or sooner if she is having problems. Call if any questions or concerns. Electronically signed by BLU Leach on 6/7/2022 at 2:57 PM  Note: This report was completed using [employer] voiced recognition software. Every effort has been made to ensure accuracy; however, inadvertent computerized transcription errors may be present.

## 2022-06-07 NOTE — PROGRESS NOTES
Patient here for a follow up on left hip pain, exacerbated w/yardwork from 05/21/2022. Patient refused new xrays today. Patient states that she feels a burning sensation in the left hip. Patient states that when she is sitting there is no pain in the hip, but when walking the pain flares up. Patient saw Dr. Jerome Barrett on 05/09/2022 and was an given ultrasound-guided LEFT femoroacetabular?(hip) joint corticosteroid injection.              Electronically signed by Aura Song MA on 6/7/2022 at 1:57 PM

## 2022-09-01 ENCOUNTER — TELEPHONE (OUTPATIENT)
Dept: ADMINISTRATIVE | Age: 62
End: 2022-09-01

## 2022-09-06 NOTE — TELEPHONE ENCOUNTER
Spoke with patient. Seen last on 6-7-2022. Instructed to follow-up in 4 months. Appointment scheduled for 9- @ 1 pm with Dr. Jefferson De La Vega to discuss possible surgery. Patient is wanting to have surgery done by end of the year, possibly November or December. Will need updated xray at her next visit.

## 2022-09-09 ENCOUNTER — TELEPHONE (OUTPATIENT)
Dept: ORTHOPEDIC SURGERY | Age: 62
End: 2022-09-09

## 2022-09-09 NOTE — TELEPHONE ENCOUNTER
Pt called in to r/s her appt on 9/13/22. She would like to know if she could come in on 9/20/22? Brian Jaimes can be reached at [phone]. Thank you.

## 2022-09-09 NOTE — TELEPHONE ENCOUNTER
Call to pt r/s'd appt   Future Appointments   Date Time Provider Ana Macias   9/20/2022  1:15 PM Kimberly Finnegan MD [facility]   11/18/2022  2:00 PM DO MARCIO Fraire [facility]

## 2022-09-20 ENCOUNTER — HOSPITAL ENCOUNTER (OUTPATIENT)
Dept: GENERAL RADIOLOGY | Age: 62
Discharge: HOME | End: 2022-09-22
Payer: COMMERCIAL

## 2022-09-20 ENCOUNTER — OFFICE VISIT (OUTPATIENT)
Dept: ORTHOPEDIC SURGERY | Age: 62
End: 2022-09-20
Payer: COMMERCIAL

## 2022-09-20 VITALS — HEIGHT: 62 IN | BODY MASS INDEX: 23.92 KG/M2 | WEIGHT: 130 LBS

## 2022-09-20 DIAGNOSIS — M16.12 PRIMARY LOCALIZED OSTEOARTHRITIS OF LEFT HIP: ICD-10-CM

## 2022-09-20 DIAGNOSIS — Z01.818 PRE-OP EXAM: Primary | ICD-10-CM

## 2022-09-20 PROCEDURE — 99212 OFFICE O/P EST SF 10 MIN: CPT | Performed by: PHYSICIAN ASSISTANT

## 2022-09-20 PROCEDURE — 3017F COLORECTAL CA SCREEN DOC REV: CPT | Performed by: PHYSICIAN ASSISTANT

## 2022-09-20 PROCEDURE — G8420 CALC BMI NORM PARAMETERS: HCPCS | Performed by: PHYSICIAN ASSISTANT

## 2022-09-20 PROCEDURE — 99214 OFFICE O/P EST MOD 30 MIN: CPT | Performed by: PHYSICIAN ASSISTANT

## 2022-09-20 PROCEDURE — G8427 DOCREV CUR MEDS BY ELIG CLIN: HCPCS | Performed by: PHYSICIAN ASSISTANT

## 2022-09-20 PROCEDURE — 73502 X-RAY EXAM HIP UNI 2-3 VIEWS: CPT

## 2022-09-20 PROCEDURE — 1036F TOBACCO NON-USER: CPT | Performed by: PHYSICIAN ASSISTANT

## 2022-09-20 NOTE — PATIENT INSTRUCTIONS
You will need to be medically cleared before surgery by your family doctor. Please call your doctor to set up an appointment for medical clearance as soon as possible and have the office fax your medical clearance to :   (FAX) [phone]   ATTN:  CHERI    1. Your surgery is scheduled for 12-20-22 at 8am  with Dr. Pa Mcgowan MD at the [facility] . You will need to report to Preop area  that morning at 6am    2. You are having Observation surgery so you will not be returning home the same day  3. Preadmission Testing (PAT) department at [facility] will contact you with all the details prior to surgery. 4. Nothing to eat or drink after midnight the night before surgery. You may take a pain pill and any other medicine PAT instructs you to take with small sip of water if needed. 5. You will need to attend Joint Education Class prior to surgery- Tuesday AM from 10-11 am  6. Continue with ice and elevation to reduce swelling  7. Weightbearing as tolerated left lower, use assistive devices as needed  8. Take pain medicine as instructed  9. Call office with any question or concerns: [phone]. Hold Lovenox/Aspirin the day of surgery. Hold all NSAIDs 7 days prior to surgery    ALL TOTAL JOINT PATIENTS WILL BE WEANED OFF ANY NARCOTIC MEDICATION GIVEN POST OPERATIVELY BY AT THE LATEST 3 WEEKS FROM DATE OF SURGERY    Due to increased risk of infections, it is important to plan for getting treatment for significant dental diseases (including tooth extractions, root canal and periodontal work) before your total joint surgery.   Routine teeth cleaning should be delayed until you are 3 months post op

## 2022-09-20 NOTE — PROGRESS NOTES
Orthopaedic H&P Note    Nadeem Bravo is a 65 y.o. female, her YOB: 1960 with the following history as recorded in [facility]:      Patient Active Problem List    Diagnosis Date Noted    Primary localized osteoarthritis of left hip 05/03/2022    Meralgia paresthetica of left side 05/03/2022    Left lower quadrant abdominal pain 01/29/2021    Left flank pain 01/29/2021    Kidney stone 01/29/2021    Acute bilateral low back pain without sciatica 01/29/2021    Encounter for annual general medical examination with abnormal findings in adult 10/30/2020    Osteoporosis without current pathological fracture 10/25/2019    Primary osteoarthritis of both hands 10/25/2019    Hypothyroidism 01/30/2017    Vitamin D deficiency 01/30/2017    Primary osteoarthritis of right knee 03/01/2016     Current Outpatient Medications   Medication Sig Dispense Refill    gabapentin (NEURONTIN) 100 MG capsule Take 1 capsule by mouth at bedtime for 30 days. Take 1 to 2 capsules nightly as needed. 30 capsule 0    levothyroxine (SYNTHROID) 88 MCG tablet Take 1 tablet by mouth daily 90 tablet 12     No current facility-administered medications for this visit. Allergies: Patient has no known allergies. Past Medical History:   Diagnosis Date    Amenorrhea     Hyperlipidemia     Hypothyroidism     Hypothyroidism     Osteopenia of hip     Polycystic ovaries      No past surgical history on file. Family History   Problem Relation Age of Onset    Other Mother     Osteoporosis Mother     Arthritis Mother     Heart Disease Mother     Alzheimer's Disease Father      Social History     Tobacco Use    Smoking status: Never    Smokeless tobacco: Never   Substance Use Topics    Alcohol use: Yes     Comment: occassionally                             Chief Complaint   Patient presents with    Hip Pain     Surgery set up         SUBJECTIVE: Nadeem Bravo is here today for their left Hip.  The patient has had pain for sometime, but last 6 months or so it has become more severe. She  was diagnosed with OA in the past. There had been no injury to the left Hip that they can remember. Amy Stockton has tried multiple conservative treatment. Has had therapy in the past, that worked at first, but the pain persisted. She did have steroid injections which did not help much. Patient also had used a left Hip bracing without relief of symptoms. They have been working on weight loss. The pain is described as typical arthritic pain, achy in the joint, becoming more severe with stairs and any twisting motion of the left Hip. Rest makes the left Hip better along with NSAIDs and over the counter analgesics, but states they are now less effective. She does use an occasional none, patient can ambulate 3 blocks prior to pain limiting their function. Amy Stockton is having difficulty with ADLs, and states this pain is limiting here activity decreasing their quality of life. She would like to discuss JAMSHID. Review of Systems   Constitutional: Negative for fever, chills, diaphoresis, appetite change and fatigue. HENT: Negative for dental issues, hearing loss and tinnitus. Negative for congestion, sinus pressure, sneezing, sore throat. Negative for headache. Eyes: Negative for visual disturbance, blurred and double vision. Negative for pain, discharge, redness and itching  Respiratory: Negative for cough, shortness of breath and wheezing. Cardiovascular: Negative for chest pain, palpitations and leg swelling. No dyspnea on exertion   Gastrointestinal:   Negative for nausea, vomiting, abdominal pain, diarrhea, constipation  or black or bloody. Hematologic\Lymphatic:  negative for bleeding, petechiae,   Genitourinary: Negative for hematuria and difficulty urinating. Musculoskeletal: Negative for neck pain and stiffness. Mild for back pain, negative joint swelling and gait problem. Skin: Negative for pallor, rash and wound.    Neurological: Negative for dizziness, tremors, seizures, weakness, light-headedness, no TIA or stroke symptoms. No numbness and headaches. Psychiatric/Behavioral: Negative. Physical Examination:   General appearance: alert, well appearing, and in no distress,  normal appearing weight  Mental status: alert, oriented to person, place, and time, normal mood, behavior, speech, dress, motor activity, and thought processes  Abdomen: soft, nondistended   Resp:   resp easy and unlabored, no audible wheezes note  Cardiac: distal pulses palpable, skin well perfused  Neurological: alert, oriented X3, normal speech, no focal findings or movement disorder noted, motor and sensory grossly normal bilaterally, normal muscle tone, no tremors, strength 5/5, normal gait and station  HEENT: normochephalic atraumatic, external ears and eyes normal, sclera normal, neck supple  Extremities:   peripheral pulses normal, no edema, redness or tenderness in the calves   Skin: normal coloration, no rashes or open wounds, no suspicious skin lesions noted  Psych: Affect euthymic   Musculoskeletal:    Extremity:  Left Lower Extremity  Skin clean dry and intact, without signs of infection  no edema noted  Compartments supple throughout thigh and leg  Calf supple and nontender  Demonstrates active knee flexion/extension, ankle plantar/dorsiflexion/great toe extension. States sensation intact to touch in sural/deep peroneal/superficial peroneal/saphenous/posterior tibial nerve distributions to foot/ankle. Palpable dorsalis pedis and posterior tibialis pulses, cap refill brisk in toes, foot warm/perfused. Moderate pain with passive internal rotation of the hip approximately 20 degrees and external rotation approximately 40 degrees. She ambulates with a mild limp on the left leg    Ht 5' 2\" (1.575 m)   Wt 130 lb (59 kg)   BMI 23.78 kg/m²      XR: 9/20/22   Pelvis and left hip:  FINDINGS:   The hip demonstrates normal alignment. No evidence of acute fracture.   No focal osseus lesion. Pelvis is intact. Moderate to severe degenerative   changes left hip joint. Impression   No acute abnormality of the hip. Grade 2 severe left hip hip joint degenerative changes. ASSESSMENT:   Diagnosis Orders   1. Pre-op exam        2. Primary localized osteoarthritis of left hip          Discussion:  The patient would like to proceed with total left Hip arthroplasty when cleared by their PCP. Zebedee Portal understands the risks include but are not limited to infection, injuries to the blood vessel and nerves, bleeding, continued pain and non relief of pain, aseptic loosening dislocation, limb length discrepancy, arthrofibrosis of the joint, further operation, deep venous thrombosis, pulmonary embolism and fracture, heart attack and death. Iris operative plan discussed, need for anticoagulation for DVT/PE prophylaxis, need for physical therapy, office follow up visits, and possible rehab stay. It was also explained patient will need to take a prophylactic dose of ATB prior to any bowel, dental or mouth procedures, including dental cleaning, for length of the implant. Did review surgery prosthesis with model with patient as well. Pain control was also discussed and the expectation is to have patient off narcotic pain meds around 3 weeks post operatively for a total joint arthroplasty     Elective Orthopedic Surgery Checklist:  [] Hemoglobin A1C must be ? 8  [] Nicotine cessation education- If nonunion surgery, needs negative Nicotine blood work  [x] Established with a PCP  [x] BMI to be ? 40 kg/m2 if pursing total joint.    [] Referral to [facility]/Weight Management  [x] No pending dental treatments prior to total joint  [x] Joint Education Class Needed   [] Any other areas of concern that need addressed prior to surgery:     PLAN:  Plan for OR on 12-20-22 with Dr. Jay Parra MD for left JAMSHID  Pre-surgery medical clearance- requested  PAT  Joint education class is indicated  NPO after midnight the night before surgery  WBAT on left lower extremity  Hold NSAIDS 7 days prior to surgery  Hold aspirin the morning of surgery  Make sure to have all dental care completed by 1 week before surgery  Patient seen and examined by Dr. Maria Victoria Negron in 2 months for preop visit. Discussed that patient will be contacted if there is any cancellations to possibly move up surgery. Electronically signed by BLU Hagan on 9/20/2022 at 2:48 PM  Note: This report was completed using [employer] voiced recognition software. Every effort has been made to ensure accuracy; however, inadvertent computerized transcription errors may be present.

## 2022-09-22 ENCOUNTER — TELEPHONE (OUTPATIENT)
Dept: ORTHOPEDIC SURGERY | Age: 62
End: 2022-09-22

## 2022-09-22 NOTE — TELEPHONE ENCOUNTER
Received call from patient stating that at her visit on 9/20/2022 with Dr. Wilmer Merritt she was to have Voltaren gel sent to pharmacy, however nothing was sent. Medication pended and routed to providers for decision and signature.     Future Appointments   Date Time Provider Ana Macias   11/18/2022  2:00 PM DO [facility]

## 2022-09-29 ENCOUNTER — TELEPHONE (OUTPATIENT)
Dept: ORTHOPEDIC SURGERY | Age: 62
End: 2022-09-29

## 2022-09-29 NOTE — TELEPHONE ENCOUNTER
Prior Authorization Form:    DEMOGRAPHICS:                     Patient Name:  Jeb Martinez  Patient :  1960            Insurance:  Payor: Madeline Benz JENNIFER / Plan: Kwesi Clarity / Product Type: *No Product type* /   Insurance ID Number:    Payer/Plan Subscr  Sex Relation Sub. Ins. ID Effective Group Num   1.  [id number] SANDY 1960 Female Self [id number] 3/1/22                                    [address] [address]       [] Prior authorization obtained    DIAGNOSIS & PROCEDURE:                       Procedure/Operation: Left Total Hip Arthroplasty           CPT Code: 25054    Diagnosis:  Left Hip OA    ICD10 Code: M16.12    [facility]    Surgeon:  Dr. Makayla Topete INFORMATION:                          Date: 2022   Time: 8 am              Anesthesia:  General and Regional                                                        Status:  Outpatient with OBS    Special Comments:         Vendor: [employer]  []   Notified     Length of Surgery (with 30 min clean up time): 2 hours    Medical clearance: Medical Clearance Requested    Pre-Op Labs:       []  Orders Placed    Electronically signed by Deshaun Benson MA on 2022 at 3:45 PM

## 2022-10-04 ENCOUNTER — TELEPHONE (OUTPATIENT)
Dept: ORTHOPEDIC SURGERY | Age: 62
End: 2022-10-04

## 2022-10-04 NOTE — TELEPHONE ENCOUNTER
Patient called an states that at her last appointment she was told to schedule an appointment is she has any questions regarding her surgery on 12/20/22. Future Appointments   Date Time Provider Ana Macias   11/18/2022  2:00 PM DO MARCIO Salazar [facility]     Routed to providers for review.

## 2022-10-04 NOTE — TELEPHONE ENCOUNTER
Can see the 18th or the 25th  Electronically signed by Maria Teresa Leong PA-C on 10/4/2022 at 8:23 AM

## 2022-10-04 NOTE — TELEPHONE ENCOUNTER
Called patient and advised available dates. Patient states that she is going to hold off on scheduling at this time, she was anxious about another injury when she called but it is doing okay. Verbalized understanding and advised to call with any questions in the future.

## 2022-10-20 ENCOUNTER — TELEPHONE (OUTPATIENT)
Dept: ORTHOPEDIC SURGERY | Age: 62
End: 2022-10-20

## 2022-10-20 NOTE — TELEPHONE ENCOUNTER
Prior Authorization Form:    DEMOGRAPHICS:                     Patient Name:  Jimbo Cummins  Patient :  1960            Insurance:  Payor: Zuly Bob JENNIFER / Plan: Tessa Auguste / Product Type: *No Product type* /   Insurance ID Number:    Payer/Plan Subscr  Sex Relation Sub. Ins. ID Effective Group Num   1. [id number] SANDY 1960 Female Self [id number] 3/1/22 [id number]                                   [address] [address]       [] Prior authorization obtained    DIAGNOSIS & PROCEDURE:                       Procedure/Operation: Left JAMSHID         CPT Code: 95085    Diagnosis:  M16.12    ICD10 Code: Left Total Hip Arthroplasty    Location:  [facility]    Surgeon:  Dr. Vu Granado INFORMATION:                          Date: 2022   Time: 8 am              Anesthesia: General and Regional                                                       Status:  Outpatient with Obs.        Special Comments:         Vendor: mii  []   Notified     Length of Surgery (with 30 min clean up time): 2 hours    Medical clearance: Medical Clearance Requested from PCP    Pre-Op Labs:       []  Orders Placed    Electronically signed by Mc Jena MA on 10/20/2022 at 9:54 AM

## 2022-11-07 ENCOUNTER — TELEPHONE (OUTPATIENT)
Dept: ADMINISTRATIVE | Age: 62
End: 2022-11-07

## 2022-11-08 NOTE — TELEPHONE ENCOUNTER
Call placed to patient, LVM, appointment made at this time.   Future Appointments   Date Time Provider Ana Macias   11/18/2022  2:00 PM [address], DO [facility]   12/6/2022  1:45 PM Flip Mendez MD [facility]

## 2022-11-12 ENCOUNTER — HOSPITAL ENCOUNTER (OUTPATIENT)
Age: 62
Discharge: HOME | End: 2022-11-12
Payer: COMMERCIAL

## 2022-11-12 DIAGNOSIS — E03.9 HYPOTHYROIDISM, UNSPECIFIED TYPE: ICD-10-CM

## 2022-11-12 DIAGNOSIS — M81.0 AGE-RELATED OSTEOPOROSIS WITHOUT CURRENT PATHOLOGICAL FRACTURE: ICD-10-CM

## 2022-11-12 DIAGNOSIS — Z00.01 ENCOUNTER FOR ANNUAL GENERAL MEDICAL EXAMINATION WITH ABNORMAL FINDINGS IN ADULT: ICD-10-CM

## 2022-11-12 LAB
ALBUMIN SERPL-MCNC: 4.7 G/DL (ref 3.5–5.2)
ALP BLD-CCNC: 74 U/L (ref 35–104)
ALT SERPL-CCNC: 17 U/L (ref 0–32)
ANION GAP SERPL CALCULATED.3IONS-SCNC: 9 MMOL/L (ref 7–16)
AST SERPL-CCNC: 25 U/L (ref 0–31)
BASOPHILS ABSOLUTE: 0.04 E9/L (ref 0–0.2)
BASOPHILS RELATIVE PERCENT: 1.1 % (ref 0–2)
BILIRUB SERPL-MCNC: 0.6 MG/DL (ref 0–1.2)
BILIRUBIN URINE: NEGATIVE
BLOOD, URINE: NEGATIVE
BUN BLDV-MCNC: 10 MG/DL (ref 6–23)
CALCIUM SERPL-MCNC: 10.1 MG/DL (ref 8.6–10.2)
CHLORIDE BLD-SCNC: 95 MMOL/L (ref 98–107)
CHOLESTEROL, TOTAL: 204 MG/DL (ref 0–199)
CLARITY: CLEAR
CO2: 30 MMOL/L (ref 22–29)
COLOR: YELLOW
CREAT SERPL-MCNC: 0.9 MG/DL (ref 0.5–1)
EOSINOPHILS ABSOLUTE: 0.13 E9/L (ref 0.05–0.5)
EOSINOPHILS RELATIVE PERCENT: 3.5 % (ref 0–6)
GFR SERPL CREATININE-BSD FRML MDRD: >60 ML/MIN/1.73
GLUCOSE BLD-MCNC: 92 MG/DL (ref 74–99)
GLUCOSE URINE: NEGATIVE MG/DL
HCT VFR BLD CALC: 40.7 % (ref 34–48)
HDLC SERPL-MCNC: 113 MG/DL
HEMOGLOBIN: 14 G/DL (ref 11.5–15.5)
IMMATURE GRANULOCYTES #: 0.02 E9/L
IMMATURE GRANULOCYTES %: 0.5 % (ref 0–5)
KETONES, URINE: NEGATIVE MG/DL
LDL CHOLESTEROL CALCULATED: 83 MG/DL (ref 0–99)
LEUKOCYTE ESTERASE, URINE: NEGATIVE
LYMPHOCYTES ABSOLUTE: 1.23 E9/L (ref 1.5–4)
LYMPHOCYTES RELATIVE PERCENT: 32.7 % (ref 20–42)
MCH RBC QN AUTO: 32.7 PG (ref 26–35)
MCHC RBC AUTO-ENTMCNC: 34.4 % (ref 32–34.5)
MCV RBC AUTO: 95.1 FL (ref 80–99.9)
MONOCYTES ABSOLUTE: 0.54 E9/L (ref 0.1–0.95)
MONOCYTES RELATIVE PERCENT: 14.4 % (ref 2–12)
NEUTROPHILS ABSOLUTE: 1.8 E9/L (ref 1.8–7.3)
NEUTROPHILS RELATIVE PERCENT: 47.8 % (ref 43–80)
NITRITE, URINE: NEGATIVE
PDW BLD-RTO: 13.2 FL (ref 11.5–15)
PH UA: 8 (ref 5–9)
PLATELET # BLD: 286 E9/L (ref 130–450)
PMV BLD AUTO: 9.4 FL (ref 7–12)
POTASSIUM SERPL-SCNC: 4.6 MMOL/L (ref 3.5–5)
PROTEIN UA: NEGATIVE MG/DL
RBC # BLD: 4.28 E12/L (ref 3.5–5.5)
SODIUM BLD-SCNC: 134 MMOL/L (ref 132–146)
SPECIFIC GRAVITY UA: 1.01 (ref 1–1.03)
T3 TOTAL: 84.8 NG/DL (ref 80–200)
T4 TOTAL: 5.6 MCG/DL (ref 4.5–11.7)
TOTAL PROTEIN: 7.2 G/DL (ref 6.4–8.3)
TRIGL SERPL-MCNC: 39 MG/DL (ref 0–149)
TSH SERPL DL<=0.05 MIU/L-ACNC: 2.98 UIU/ML (ref 0.27–4.2)
UROBILINOGEN, URINE: 0.2 E.U./DL
VITAMIN D 25-HYDROXY: 38 NG/ML (ref 30–100)
VLDLC SERPL CALC-MCNC: 8 MG/DL
WBC # BLD: 3.8 E9/L (ref 4.5–11.5)

## 2022-11-12 PROCEDURE — 84480 ASSAY TRIIODOTHYRONINE (T3): CPT

## 2022-11-12 PROCEDURE — 82306 VITAMIN D 25 HYDROXY: CPT

## 2022-11-12 PROCEDURE — 84443 ASSAY THYROID STIM HORMONE: CPT

## 2022-11-12 PROCEDURE — 80061 LIPID PANEL: CPT

## 2022-11-12 PROCEDURE — 84436 ASSAY OF TOTAL THYROXINE: CPT

## 2022-11-12 PROCEDURE — 36415 COLL VENOUS BLD VENIPUNCTURE: CPT

## 2022-11-12 PROCEDURE — 80053 COMPREHEN METABOLIC PANEL: CPT

## 2022-11-12 PROCEDURE — 81003 URINALYSIS AUTO W/O SCOPE: CPT

## 2022-11-12 PROCEDURE — 85025 COMPLETE CBC W/AUTO DIFF WBC: CPT

## 2022-11-15 ENCOUNTER — OFFICE VISIT (OUTPATIENT)
Dept: PRIMARY CARE CLINIC | Age: 62
End: 2022-11-15
Payer: COMMERCIAL

## 2022-11-15 VITALS
HEIGHT: 62 IN | WEIGHT: 129 LBS | BODY MASS INDEX: 23.74 KG/M2 | TEMPERATURE: 99.2 F | DIASTOLIC BLOOD PRESSURE: 78 MMHG | SYSTOLIC BLOOD PRESSURE: 128 MMHG

## 2022-11-15 DIAGNOSIS — M16.12 PRIMARY OSTEOARTHRITIS OF LEFT HIP: ICD-10-CM

## 2022-11-15 DIAGNOSIS — E78.2 MIXED HYPERLIPIDEMIA: ICD-10-CM

## 2022-11-15 DIAGNOSIS — E53.8 VITAMIN B 12 DEFICIENCY: ICD-10-CM

## 2022-11-15 DIAGNOSIS — M81.0 AGE-RELATED OSTEOPOROSIS WITHOUT CURRENT PATHOLOGICAL FRACTURE: ICD-10-CM

## 2022-11-15 DIAGNOSIS — E03.9 ACQUIRED HYPOTHYROIDISM: Chronic | ICD-10-CM

## 2022-11-15 DIAGNOSIS — Z00.01 ENCOUNTER FOR ANNUAL GENERAL MEDICAL EXAMINATION WITH ABNORMAL FINDINGS IN ADULT: Primary | ICD-10-CM

## 2022-11-15 PROCEDURE — 99396 PREV VISIT EST AGE 40-64: CPT | Performed by: FAMILY MEDICINE

## 2022-11-15 PROCEDURE — G8484 FLU IMMUNIZE NO ADMIN: HCPCS | Performed by: FAMILY MEDICINE

## 2022-11-15 ASSESSMENT — PATIENT HEALTH QUESTIONNAIRE - PHQ9
SUM OF ALL RESPONSES TO PHQ QUESTIONS 1-9: 0
1. LITTLE INTEREST OR PLEASURE IN DOING THINGS: 0
SUM OF ALL RESPONSES TO PHQ9 QUESTIONS 1 & 2: 0
SUM OF ALL RESPONSES TO PHQ QUESTIONS 1-9: 0
2. FEELING DOWN, DEPRESSED OR HOPELESS: 0
SUM OF ALL RESPONSES TO PHQ QUESTIONS 1-9: 0
SUM OF ALL RESPONSES TO PHQ QUESTIONS 1-9: 0

## 2022-11-15 NOTE — PROGRESS NOTES
11/15/22  Name: Mele Shah    : 1960    Sex: female    Age: 65 y.o. Subjective:  Chief Complaint: Patient is here for omne year ck   hip arth  thryoid        Feel ok   left hip pain but she feels not ready      she coud not see  dr Godfrey Resides due to ins      Review of Systems   Constitutional: Negative. HENT: Negative. Eyes: Negative. Respiratory: Negative. Cardiovascular: Negative. Gastrointestinal: Negative. Endocrine: Negative. Genitourinary: Negative. Musculoskeletal:         See hpi   Skin: Negative. Allergic/Immunologic: Negative. Neurological: Negative. Hematological: Negative. Psychiatric/Behavioral: Negative. Current Outpatient Medications:     diclofenac sodium (VOLTAREN) 1 % GEL, Apply 4 g topically 4 times daily, Disp: 150 g, Rfl: 3    gabapentin (NEURONTIN) 100 MG capsule, Take 1 capsule by mouth at bedtime for 30 days. Take 1 to 2 capsules nightly as needed. , Disp: 30 capsule, Rfl: 0    levothyroxine (SYNTHROID) 88 MCG tablet, Take 1 tablet by mouth daily, Disp: 90 tablet, Rfl: 12  No Known Allergies  Social History     Socioeconomic History    Marital status: Single     Spouse name: Not on file    Number of children: Not on file    Years of education: Not on file    Highest education level: Not on file   Occupational History    Not on file   Tobacco Use    Smoking status: Never    Smokeless tobacco: Never   Substance and Sexual Activity    Alcohol use: Yes     Comment: occassionally    Drug use: No    Sexual activity: Not on file   Other Topics Concern    Not on file   Social History Narrative            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 Riya Cantrell April 2021 due 10 years    Left hip arthritis    seeing  dr Nancy Giraldo     Social Determinants of Health     Financial Resource Strain: Not on file   Food Insecurity: Not on file   Transportation Needs: Not on file   Physical Activity: Not on file   Stress: Not on file   Social Connections: Not on file   Intimate Partner Violence: Not on file   Housing Stability: Not on file      Past Medical History:   Diagnosis Date    Amenorrhea     Hyperlipidemia     Hypothyroidism     Hypothyroidism     Osteopenia of hip     Polycystic ovaries      Family History   Problem Relation Age of Onset    Other Mother     Osteoporosis Mother     Arthritis Mother     Heart Disease Mother     Alzheimer's Disease Father       No past surgical history on file. Vitals:    11/15/22 0942   BP: 128/78   Temp: 99.2 °F (37.3 °C)   TempSrc: Oral   Weight: 129 lb (58.5 kg)   Height: 5' 2\" (1.575 m)       Objective:    Physical Exam  Vitals reviewed. Constitutional:       Appearance: Normal appearance. She is well-developed. HENT:      Head: Normocephalic. Right Ear: Tympanic membrane normal.      Left Ear: Tympanic membrane normal.      Nose: Nose normal.      Mouth/Throat:      Mouth: Mucous membranes are moist.   Eyes:      Pupils: Pupils are equal, round, and reactive to light. Cardiovascular:      Rate and Rhythm: Normal rate and regular rhythm. Pulmonary:      Effort: Pulmonary effort is normal.      Breath sounds: Normal breath sounds. Abdominal:      General: Bowel sounds are normal.      Palpations: Abdomen is soft. Musculoskeletal:      Cervical back: Normal range of motion. Comments: Full rom left hip   Skin:     General: Skin is warm. Neurological:      Mental Status: She is alert and oriented to person, place, and time. Psychiatric:         Behavior: Behavior normal.       Gabbie Thompson was seen today for annual exam and hip pain.     Diagnoses and all orders for this visit:    Encounter for annual general medical examination with abnormal findings in adult    Primary osteoarthritis of left hip  -     MRI LUMBAR SPINE WO CONTRAST; Future    Acquired hypothyroidism    Mixed hyperlipidemia      Comments: mri left hip per her rquest   krishrbaulio arandagirma  Se me  29 d before surgyer      I educated the patient about all medications. Make sure they were correct and educated  on the risk associated with missing meds or taking them incorrectly. A list of medications is being sent home with patient today. Check blood pressure at home twice a day. Low-salt low caffeine diet. Call if systolic blood pressure is above 405 and diastolic blood pressures above 85. Only use a upper arm digital cuff. Aggressive low-fat diet. Avoid red meats, greasy fried foods, dairy products. Avoid processed foods. Take cholesterol medications without food. I informed patient about the risk associated with noncompliance of medication and taking medications incorrectly. Appropriate follow-up with myself and all specialist.  Encourage family members to take active role in assisting with medications and medical care. If any confusion should develop to notify my office immediately to avoid risk of worsening medical condition    A great deal of time spent reviewing medications, diet, exercise, social issues. Also reviewing the chart before entering the room with patient and finishing charting after leaving patient's room. More than half of that time was spent face to face with the patient in counseling and coordinating care.     -Advised patient to take thyroid med on an empty stomach at least 30 minutes before breakfast and without combination of other medications.  -Patient was advised if she were to take calcium or iron supplementation to wait at least 4 hours after Synthroid to take medication  -Counseled on symptoms of hypo-/hyperthyroidism  -Patient verbalized understanding      Follow Up: Return in about 1 year (around 11/15/2023) for Lab Before.      Seen by:  Bisi Hui DO

## 2022-11-17 DIAGNOSIS — E03.9 HYPOTHYROIDISM, UNSPECIFIED TYPE: ICD-10-CM

## 2022-11-17 RX ORDER — LEVOTHYROXINE SODIUM 88 UG/1
88 TABLET ORAL DAILY
Qty: 90 TABLET | Refills: 12 | Status: SHIPPED | OUTPATIENT
Start: 2022-11-17

## 2022-11-21 ENCOUNTER — TELEPHONE (OUTPATIENT)
Dept: ADMINISTRATIVE | Age: 62
End: 2022-11-21

## 2022-11-25 NOTE — TELEPHONE ENCOUNTER
Confirmed.   will notify Dr. Karthik Carson  Electronically signed by Nestor Reyes PA-C on 11/25/2022 at 12:55 PM

## 2022-11-29 NOTE — TELEPHONE ENCOUNTER
Left message with [facility] Surgery Scheduling Desk to cancel surgery for 12-6-2022, do not r/s.

## 2022-12-01 ENCOUNTER — TELEPHONE (OUTPATIENT)
Dept: PRIMARY CARE CLINIC | Age: 62
End: 2022-12-01

## 2022-12-01 DIAGNOSIS — M16.12 PRIMARY OSTEOARTHRITIS OF LEFT HIP: Primary | ICD-10-CM

## 2022-12-01 NOTE — TELEPHONE ENCOUNTER
----- Message from Claudell Hood sent at 12/1/2022  1:12 PM EST -----  Subject: Referral Request    Reason for referral request? hip replacement   Provider patient wants to be referred to(if known):     Provider Phone Number(if known):     Additional Information for Provider? she wants to be referred to the same   dr that did dr Shay Hernandez procedure  ---------------------------------------------------------------------------  --------------  [email or web address] [facility]    [phone]; OK to leave message on voicemail  ---------------------------------------------------------------------------  --------------

## 2022-12-02 NOTE — TELEPHONE ENCOUNTER
Tell patient I put in that referral to Dr. Emir Clrak. I thought she told me on her last visit that he did not accept her insurance. Maybe her insurance is changing.   Let me know if he will not see her we will refer her to someone else

## 2023-01-03 ENCOUNTER — COMMUNITY OUTREACH (OUTPATIENT)
Dept: PRIMARY CARE CLINIC | Age: 63
End: 2023-01-03

## 2023-01-31 ENCOUNTER — OFFICE VISIT (OUTPATIENT)
Dept: PRIMARY CARE CLINIC | Age: 63
End: 2023-01-31
Payer: COMMERCIAL

## 2023-01-31 VITALS
SYSTOLIC BLOOD PRESSURE: 126 MMHG | DIASTOLIC BLOOD PRESSURE: 75 MMHG | WEIGHT: 134.8 LBS | TEMPERATURE: 97.3 F | HEIGHT: 62 IN | BODY MASS INDEX: 24.8 KG/M2

## 2023-01-31 DIAGNOSIS — E03.9 ACQUIRED HYPOTHYROIDISM: Chronic | ICD-10-CM

## 2023-01-31 DIAGNOSIS — Z01.818 PRE-OP EXAM: Primary | ICD-10-CM

## 2023-01-31 DIAGNOSIS — M16.12 PRIMARY LOCALIZED OSTEOARTHRITIS OF LEFT HIP: ICD-10-CM

## 2023-01-31 PROCEDURE — 93000 ELECTROCARDIOGRAM COMPLETE: CPT | Performed by: FAMILY MEDICINE

## 2023-01-31 PROCEDURE — 99213 OFFICE O/P EST LOW 20 MIN: CPT | Performed by: FAMILY MEDICINE

## 2023-01-31 ASSESSMENT — ENCOUNTER SYMPTOMS
ALLERGIC/IMMUNOLOGIC NEGATIVE: 1
EYES NEGATIVE: 1
RESPIRATORY NEGATIVE: 1
GASTROINTESTINAL NEGATIVE: 1

## 2023-01-31 ASSESSMENT — PATIENT HEALTH QUESTIONNAIRE - PHQ9
SUM OF ALL RESPONSES TO PHQ QUESTIONS 1-9: 0
2. FEELING DOWN, DEPRESSED OR HOPELESS: 0
SUM OF ALL RESPONSES TO PHQ9 QUESTIONS 1 & 2: 0
1. LITTLE INTEREST OR PLEASURE IN DOING THINGS: 0

## 2023-01-31 NOTE — PROGRESS NOTES
23  Name: Palomo Marie    : 1960    Sex: female    Age: 65 y.o. Subjective:  Chief Complaint: Patient is here for pre op left  hip     Feel ok  no cp or sob             Review of Systems   Constitutional: Negative. HENT: Negative. Eyes: Negative. Respiratory: Negative. Cardiovascular: Negative. Gastrointestinal: Negative. Endocrine: Negative. Genitourinary: Negative. Musculoskeletal: Negative. Left hip pain   Skin: Negative. Allergic/Immunologic: Negative. Neurological: Negative. Hematological: Negative. Psychiatric/Behavioral: Negative.          Current Outpatient Medications:     levothyroxine (SYNTHROID) 88 MCG tablet, Take 1 tablet by mouth daily, Disp: 90 tablet, Rfl: 12  No Known Allergies  Social History     Socioeconomic History    Marital status: Single     Spouse name: Not on file    Number of children: Not on file    Years of education: Not on file    Highest education level: Not on file   Occupational History    Not on file   Tobacco Use    Smoking status: Never    Smokeless tobacco: Never   Substance and Sexual Activity    Alcohol use: Yes     Comment: occassionally    Drug use: No    Sexual activity: Not on file   Other Topics Concern    Not on file   Social History Narrative            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 Marcella Jenkins 2021 due 10 years    Left hip arthritis    seeing  dr Diallo Johnson------- chrisal  dr Marquis Mode   left total hip  2-15-23     Social Determinants of Health     Financial Resource Strain: Not on file   Food Insecurity: Not on file   Transportation Needs: Not on file   Physical Activity: Not on file   Stress: Not on file   Social Connections: Not on file   Intimate Partner Violence: Not on file   Housing Stability: Not on file      Past Medical History:   Diagnosis Date    Amenorrhea     Hyperlipidemia     Hypothyroidism     Hypothyroidism     Osteopenia of hip     Polycystic ovaries      Family History   Problem Relation Age of Onset    Other Mother     Osteoporosis Mother     Arthritis Mother     Heart Disease Mother     Alzheimer's Disease Father       No past surgical history on file. Vitals:    01/31/23 1236   BP: 126/75   Temp: 97.3 °F (36.3 °C)   Weight: 134 lb 12.8 oz (61.1 kg)   Height: 5' 2\" (1.575 m)       Objective:    Physical Exam  Vitals reviewed. Constitutional:       Appearance: Normal appearance. She is well-developed. HENT:      Head: Normocephalic. Right Ear: Tympanic membrane normal.      Left Ear: Tympanic membrane normal.      Nose: Nose normal.      Mouth/Throat:      Mouth: Mucous membranes are moist.   Eyes:      Pupils: Pupils are equal, round, and reactive to light. Cardiovascular:      Rate and Rhythm: Normal rate and regular rhythm. Pulmonary:      Effort: Pulmonary effort is normal.      Breath sounds: Normal breath sounds. Abdominal:      General: Bowel sounds are normal.      Palpations: Abdomen is soft. Musculoskeletal:      Cervical back: Normal range of motion. Comments: Marked dec rom left hip   Skin:     General: Skin is warm. Neurological:      Mental Status: She is alert and oriented to person, place, and time. Psychiatric:         Behavior: Behavior normal.       Beena Gabriel was seen today for pre-op exam.    Diagnoses and all orders for this visit:    Pre-op exam  -     EKG 12 Lead - Clinic Performed; Future  -     EKG 12 Lead - Clinic Performed    Primary localized osteoarthritis of left hip    Acquired hypothyroidism      Comments: ekg     no acute chg    dietexer  hm  issues          I educated the patient about all medications. Make sure they were correct and educated  on the risk associated with missing meds or taking them incorrectly. A list of medications is being sent home with patient today. .    A great deal of time spent reviewing medications, diet, exercise, social issues. Also reviewing the chart before entering the room with patient and finishing charting after leaving patient's room. More than half of that time was spent face to face with the patient in counseling and coordinating care. I informed patient about the risk associated with noncompliance of medication and taking medications incorrectly. Appropriate follow-up with myself and all specialist.  Encourage family members to take active role in assisting with medications and medical care. If any confusion should develop to notify my office immediately to avoid risk of worsening medical condition      -Advised patient to take thyroid med on an empty stomach at least 30 minutes before breakfast and without combination of other medications.  -Patient was advised if she were to take calcium or iron supplementation to wait at least 4 hours after Synthroid to take medication  -Counseled on symptoms of hypo-/hyperthyroidism  -Patient verbalized understanding      Follow Up: Return for Reg Appt.      Seen by:  Sofia Macias DO

## 2023-02-01 ENCOUNTER — TELEPHONE (OUTPATIENT)
Dept: PRIMARY CARE CLINIC | Age: 63
End: 2023-02-01

## 2023-02-01 NOTE — TELEPHONE ENCOUNTER
[facility] @ [facility] called to check status of pre-op appt with Dr. Pratima Doss. Advised pt was seen on 1/31. Low risk for surgery faxed to Dr. Torey Ryder along with ekg and office note. No labs were done at that time.   She said she can access thru Dr. Torey Ryder' office

## 2023-02-06 ENCOUNTER — TELEPHONE (OUTPATIENT)
Dept: PRIMARY CARE CLINIC | Age: 63
End: 2023-02-06

## 2023-08-14 ENCOUNTER — TELEPHONE (OUTPATIENT)
Dept: PRIMARY CARE CLINIC | Age: 63
End: 2023-08-14

## 2023-08-14 DIAGNOSIS — R92.2 DENSE BREAST: Primary | ICD-10-CM

## 2023-08-14 NOTE — TELEPHONE ENCOUNTER
The pt is calling because it is time to do her mammogram, when she talked to [facility] they told her to contact her doctor and see if a 3D mammogram can be ordered because she has a lot of denseness, she will call back and let us know later where she wants the ordered sent

## 2023-08-15 NOTE — TELEPHONE ENCOUNTER
Tell patient order completed I put in for generally Abdou breast the [facility]    3   d  machine

## 2023-08-28 ENCOUNTER — HOSPITAL ENCOUNTER (OUTPATIENT)
Dept: GENERAL RADIOLOGY | Age: 63
Discharge: HOME | End: 2023-08-30
Payer: COMMERCIAL

## 2023-08-28 VITALS — HEIGHT: 63 IN | BODY MASS INDEX: 23.04 KG/M2 | WEIGHT: 130 LBS

## 2023-08-28 DIAGNOSIS — R92.2 DENSE BREAST: ICD-10-CM

## 2023-08-28 PROCEDURE — G0279 TOMOSYNTHESIS, MAMMO: HCPCS

## 2023-11-09 ENCOUNTER — HOSPITAL ENCOUNTER (OUTPATIENT)
Age: 63
Discharge: HOME | End: 2023-11-09
Payer: COMMERCIAL

## 2023-11-09 DIAGNOSIS — Z00.01 ENCOUNTER FOR ANNUAL GENERAL MEDICAL EXAMINATION WITH ABNORMAL FINDINGS IN ADULT: ICD-10-CM

## 2023-11-09 DIAGNOSIS — M81.0 AGE-RELATED OSTEOPOROSIS WITHOUT CURRENT PATHOLOGICAL FRACTURE: ICD-10-CM

## 2023-11-09 DIAGNOSIS — E53.8 VITAMIN B 12 DEFICIENCY: ICD-10-CM

## 2023-11-09 DIAGNOSIS — E78.2 MIXED HYPERLIPIDEMIA: ICD-10-CM

## 2023-11-09 DIAGNOSIS — E03.9 ACQUIRED HYPOTHYROIDISM: Chronic | ICD-10-CM

## 2023-11-09 LAB
25(OH)D3 SERPL-MCNC: 33.9 NG/ML (ref 30–100)
ALBUMIN SERPL-MCNC: 4.5 G/DL (ref 3.5–5.2)
ALP SERPL-CCNC: 71 U/L (ref 35–104)
ALT SERPL-CCNC: 15 U/L (ref 0–32)
ANION GAP SERPL CALCULATED.3IONS-SCNC: 10 MMOL/L (ref 7–16)
AST SERPL-CCNC: 23 U/L (ref 0–31)
BACTERIA URNS QL MICRO: ABNORMAL
BASOPHILS # BLD: 0.04 K/UL (ref 0–0.2)
BASOPHILS NFR BLD: 1 % (ref 0–2)
BILIRUB SERPL-MCNC: 0.6 MG/DL (ref 0–1.2)
BILIRUB UR QL STRIP: NEGATIVE
BUN SERPL-MCNC: 10 MG/DL (ref 6–23)
CALCIUM SERPL-MCNC: 9.7 MG/DL (ref 8.6–10.2)
CHLORIDE SERPL-SCNC: 97 MMOL/L (ref 98–107)
CHOLEST SERPL-MCNC: 193 MG/DL
CLARITY UR: CLEAR
CO2 SERPL-SCNC: 28 MMOL/L (ref 22–29)
COLOR UR: YELLOW
CREAT SERPL-MCNC: 0.7 MG/DL (ref 0.5–1)
EOSINOPHIL # BLD: 0.12 K/UL (ref 0.05–0.5)
EOSINOPHILS RELATIVE PERCENT: 4 % (ref 0–6)
ERYTHROCYTE [DISTWIDTH] IN BLOOD BY AUTOMATED COUNT: 13.4 % (ref 11.5–15)
GFR SERPL CREATININE-BSD FRML MDRD: >60 ML/MIN/1.73M2
GLUCOSE SERPL-MCNC: 86 MG/DL (ref 74–99)
GLUCOSE UR STRIP-MCNC: NEGATIVE MG/DL
HCT VFR BLD AUTO: 38.8 % (ref 34–48)
HDLC SERPL-MCNC: 102 MG/DL
HGB BLD-MCNC: 13.3 G/DL (ref 11.5–15.5)
HGB UR QL STRIP.AUTO: ABNORMAL
IMM GRANULOCYTES # BLD AUTO: <0.03 K/UL (ref 0–0.58)
IMM GRANULOCYTES NFR BLD: 0 % (ref 0–5)
KETONES UR STRIP-MCNC: NEGATIVE MG/DL
LDLC SERPL CALC-MCNC: 80 MG/DL
LEUKOCYTE ESTERASE UR QL STRIP: ABNORMAL
LYMPHOCYTES NFR BLD: 1.17 K/UL (ref 1.5–4)
LYMPHOCYTES RELATIVE PERCENT: 35 % (ref 20–42)
MCH RBC QN AUTO: 33.2 PG (ref 26–35)
MCHC RBC AUTO-ENTMCNC: 34.3 G/DL (ref 32–34.5)
MCV RBC AUTO: 96.8 FL (ref 80–99.9)
MONOCYTES NFR BLD: 0.52 K/UL (ref 0.1–0.95)
MONOCYTES NFR BLD: 16 % (ref 2–12)
NEUTROPHILS NFR BLD: 45 % (ref 43–80)
NEUTS SEG NFR BLD: 1.49 K/UL (ref 1.8–7.3)
NITRITE UR QL STRIP: NEGATIVE
PH UR STRIP: 8 [PH] (ref 5–9)
PLATELET # BLD AUTO: 240 K/UL (ref 130–450)
PMV BLD AUTO: 9.6 FL (ref 7–12)
POTASSIUM SERPL-SCNC: 4.4 MMOL/L (ref 3.5–5)
PROT SERPL-MCNC: 7.1 G/DL (ref 6.4–8.3)
PROT UR STRIP-MCNC: NEGATIVE MG/DL
RBC # BLD AUTO: 4.01 M/UL (ref 3.5–5.5)
RBC #/AREA URNS HPF: ABNORMAL /HPF
SODIUM SERPL-SCNC: 135 MMOL/L (ref 132–146)
SP GR UR STRIP: 1.01 (ref 1–1.03)
T4 SERPL-MCNC: 4.5 UG/DL (ref 4.5–11.7)
TRIGL SERPL-MCNC: 53 MG/DL
TSH SERPL DL<=0.05 MIU/L-ACNC: 2.38 UIU/ML (ref 0.27–4.2)
UROBILINOGEN UR STRIP-ACNC: 0.2 EU/DL (ref 0–1)
VIT B12 SERPL-MCNC: 359 PG/ML (ref 211–946)
VLDLC SERPL CALC-MCNC: 11 MG/DL
WBC #/AREA URNS HPF: ABNORMAL /HPF
WBC OTHER # BLD: 3.4 K/UL (ref 4.5–11.5)

## 2023-11-09 PROCEDURE — 36415 COLL VENOUS BLD VENIPUNCTURE: CPT

## 2023-11-09 PROCEDURE — 84443 ASSAY THYROID STIM HORMONE: CPT

## 2023-11-09 PROCEDURE — 84436 ASSAY OF TOTAL THYROXINE: CPT

## 2023-11-09 PROCEDURE — 81001 URINALYSIS AUTO W/SCOPE: CPT

## 2023-11-09 PROCEDURE — 80061 LIPID PANEL: CPT

## 2023-11-09 PROCEDURE — 80053 COMPREHEN METABOLIC PANEL: CPT

## 2023-11-09 PROCEDURE — 82306 VITAMIN D 25 HYDROXY: CPT

## 2023-11-09 PROCEDURE — 82607 VITAMIN B-12: CPT

## 2023-11-09 PROCEDURE — 85025 COMPLETE CBC W/AUTO DIFF WBC: CPT

## 2023-11-21 ENCOUNTER — OFFICE VISIT (OUTPATIENT)
Dept: PRIMARY CARE CLINIC | Age: 63
End: 2023-11-21
Payer: COMMERCIAL

## 2023-11-21 VITALS
DIASTOLIC BLOOD PRESSURE: 74 MMHG | TEMPERATURE: 99.1 F | WEIGHT: 133 LBS | SYSTOLIC BLOOD PRESSURE: 128 MMHG | BODY MASS INDEX: 23.56 KG/M2

## 2023-11-21 DIAGNOSIS — Z00.01 ENCOUNTER FOR ANNUAL GENERAL MEDICAL EXAMINATION WITH ABNORMAL FINDINGS IN ADULT: Primary | ICD-10-CM

## 2023-11-21 DIAGNOSIS — N20.0 KIDNEY STONE: ICD-10-CM

## 2023-11-21 DIAGNOSIS — E53.8 VITAMIN B 12 DEFICIENCY: ICD-10-CM

## 2023-11-21 DIAGNOSIS — M81.0 AGE-RELATED OSTEOPOROSIS WITHOUT CURRENT PATHOLOGICAL FRACTURE: ICD-10-CM

## 2023-11-21 DIAGNOSIS — E55.9 VITAMIN D DEFICIENCY: Chronic | ICD-10-CM

## 2023-11-21 DIAGNOSIS — R92.2 DENSE BREAST TISSUE: ICD-10-CM

## 2023-11-21 DIAGNOSIS — E03.9 HYPOTHYROIDISM, UNSPECIFIED TYPE: ICD-10-CM

## 2023-11-21 PROCEDURE — 99396 PREV VISIT EST AGE 40-64: CPT | Performed by: FAMILY MEDICINE

## 2023-11-21 RX ORDER — LEVOTHYROXINE SODIUM 88 UG/1
88 TABLET ORAL DAILY
Qty: 90 TABLET | Refills: 12 | Status: SHIPPED | OUTPATIENT
Start: 2023-11-21

## 2023-11-21 SDOH — ECONOMIC STABILITY: FOOD INSECURITY: WITHIN THE PAST 12 MONTHS, THE FOOD YOU BOUGHT JUST DIDN'T LAST AND YOU DIDN'T HAVE MONEY TO GET MORE.: NEVER TRUE

## 2023-11-21 SDOH — ECONOMIC STABILITY: FOOD INSECURITY: WITHIN THE PAST 12 MONTHS, YOU WORRIED THAT YOUR FOOD WOULD RUN OUT BEFORE YOU GOT MONEY TO BUY MORE.: NEVER TRUE

## 2023-11-21 SDOH — ECONOMIC STABILITY: HOUSING INSECURITY
IN THE LAST 12 MONTHS, WAS THERE A TIME WHEN YOU DID NOT HAVE A STEADY PLACE TO SLEEP OR SLEPT IN A SHELTER (INCLUDING NOW)?: NO

## 2023-11-21 SDOH — ECONOMIC STABILITY: INCOME INSECURITY: HOW HARD IS IT FOR YOU TO PAY FOR THE VERY BASICS LIKE FOOD, HOUSING, MEDICAL CARE, AND HEATING?: NOT HARD AT ALL

## 2023-11-21 ASSESSMENT — ENCOUNTER SYMPTOMS
GASTROINTESTINAL NEGATIVE: 1
ALLERGIC/IMMUNOLOGIC NEGATIVE: 1
RESPIRATORY NEGATIVE: 1
EYES NEGATIVE: 1

## 2023-11-21 NOTE — PROGRESS NOTES
23  Name: Erin Dumas    : 1960    Sex: female    Age: 59 y.o. Subjective:  Chief Complaint: Patient is here for  one year ck  re  thyroid  bp   arth  hip     Feels ok    no cp or sob      happy with   hip  Xrm ntoed will do us   due to dnese  breats  she quti vit d  and went yulia on with    lab ersolts        Review of Systems   Constitutional: Negative. HENT: Negative. Eyes: Negative. Respiratory: Negative. Cardiovascular: Negative. Gastrointestinal: Negative. Endocrine: Negative. Genitourinary: Negative. Musculoskeletal: Negative. Skin: Negative. Allergic/Immunologic: Negative. Neurological: Negative. Hematological: Negative. Psychiatric/Behavioral: Negative.            Current Outpatient Medications:     levothyroxine (SYNTHROID) 88 MCG tablet, Take 1 tablet by mouth daily, Disp: 90 tablet, Rfl: 12  No Known Allergies  Social History     Socioeconomic History    Marital status: Single     Spouse name: Not on file    Number of children: Not on file    Years of education: Not on file    Highest education level: Not on file   Occupational History    Not on file   Tobacco Use    Smoking status: Never    Smokeless tobacco: Never   Substance and Sexual Activity    Alcohol use: Yes     Comment: occassionally    Drug use: No    Sexual activity: Not on file   Other Topics Concern    Not on file   Social History Narrative            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 Roshni Henriquez 2021 due 10 years    Left hip arthritis    seeing  dr Nabil Victoria------- eval  dr Ranjeet Pringle   left total hip  2-15-23     Social Determinants of Health

## 2024-04-05 ENCOUNTER — HOSPITAL ENCOUNTER (OUTPATIENT)
Dept: GENERAL RADIOLOGY | Age: 64
End: 2024-04-05
Payer: COMMERCIAL

## 2024-04-05 ENCOUNTER — HOSPITAL ENCOUNTER (OUTPATIENT)
Dept: GENERAL RADIOLOGY | Age: 64
Discharge: HOME | End: 2024-04-05
Payer: COMMERCIAL

## 2024-04-05 VITALS — HEIGHT: 63 IN | WEIGHT: 130 LBS | BODY MASS INDEX: 23.04 KG/M2

## 2024-04-05 DIAGNOSIS — R92.30 BREAST DENSITY: ICD-10-CM

## 2024-04-05 DIAGNOSIS — M81.0 AGE-RELATED OSTEOPOROSIS WITHOUT CURRENT PATHOLOGICAL FRACTURE: ICD-10-CM

## 2024-04-05 DIAGNOSIS — R92.30 BREAST DENSITY: Primary | ICD-10-CM

## 2024-04-05 PROCEDURE — 76641 ULTRASOUND BREAST COMPLETE: CPT

## 2024-04-05 PROCEDURE — 77080 DXA BONE DENSITY AXIAL: CPT

## 2024-06-03 ENCOUNTER — TELEPHONE (OUTPATIENT)
Dept: PRIMARY CARE CLINIC | Age: 64
End: 2024-06-03

## 2024-06-03 DIAGNOSIS — M25.552 LEFT HIP PAIN: Primary | ICD-10-CM

## 2024-06-03 NOTE — TELEPHONE ENCOUNTER
----- Message from Pooja Valenzuela sent at 6/3/2024  4:05 PM EDT -----  Regarding: [facility] Referral Request  [facility] Referral Request    Reason for referral request: Lab/Test Order    Specialist/Lab/Test patient is requesting (if known):    Specialist Phone Number (if applicable):    Additional Information Xray for left hip because she had a hip replacement last year but a few days she experience a pain.  --------------------------------------------------------------------------------------------------------------------------    Relationship to Patient: Self     Call Back Information: OK to leave message on voicemail  Preferred Call Back Number: Phone [phone]

## 2024-06-03 NOTE — TELEPHONE ENCOUNTER
Patient requesting xray: She states her left hip that has been replacement years agp o. Recently she has noticed a clicking. Requesting Hip Xray to make sure appliances still attached

## 2024-11-12 DIAGNOSIS — Z00.01 ENCOUNTER FOR ANNUAL GENERAL MEDICAL EXAMINATION WITH ABNORMAL FINDINGS IN ADULT: ICD-10-CM

## 2024-11-12 DIAGNOSIS — E53.8 VITAMIN B 12 DEFICIENCY: ICD-10-CM

## 2024-11-12 DIAGNOSIS — E55.9 VITAMIN D DEFICIENCY: Chronic | ICD-10-CM

## 2024-11-12 DIAGNOSIS — E03.9 HYPOTHYROIDISM, UNSPECIFIED TYPE: ICD-10-CM

## 2024-11-12 LAB
ALBUMIN: 4.6 G/DL (ref 3.5–5.2)
ALP BLD-CCNC: 77 U/L (ref 35–104)
ALT SERPL-CCNC: 14 U/L (ref 0–32)
ANION GAP SERPL CALCULATED.3IONS-SCNC: 15 MMOL/L (ref 7–16)
AST SERPL-CCNC: 24 U/L (ref 0–31)
BASOPHILS ABSOLUTE: 0.05 K/UL (ref 0–0.2)
BASOPHILS RELATIVE PERCENT: 1 % (ref 0–2)
BILIRUB SERPL-MCNC: 0.6 MG/DL (ref 0–1.2)
BILIRUBIN, URINE: NEGATIVE
BUN BLDV-MCNC: 10 MG/DL (ref 6–23)
CALCIUM SERPL-MCNC: 9.7 MG/DL (ref 8.6–10.2)
CHLORIDE BLD-SCNC: 94 MMOL/L (ref 98–107)
CHOLESTEROL, TOTAL: 200 MG/DL
CO2: 22 MMOL/L (ref 22–29)
COLOR, UA: YELLOW
COMMENT: NORMAL
CREAT SERPL-MCNC: 0.6 MG/DL (ref 0.5–1)
EOSINOPHILS ABSOLUTE: 0.13 K/UL (ref 0.05–0.5)
EOSINOPHILS RELATIVE PERCENT: 3 % (ref 0–6)
GFR, ESTIMATED: >90 ML/MIN/1.73M2
GLUCOSE BLD-MCNC: 89 MG/DL (ref 74–99)
GLUCOSE URINE: NEGATIVE MG/DL
HCT VFR BLD CALC: 38.3 % (ref 34–48)
HDLC SERPL-MCNC: 100 MG/DL
HEMOGLOBIN: 13.2 G/DL (ref 11.5–15.5)
IMMATURE GRANULOCYTES %: 0 % (ref 0–5)
IMMATURE GRANULOCYTES ABSOLUTE: <0.03 K/UL (ref 0–0.58)
KETONES, URINE: NEGATIVE MG/DL
LDL CHOLESTEROL: 89 MG/DL
LEUKOCYTE ESTERASE, URINE: NEGATIVE
LYMPHOCYTES ABSOLUTE: 1.3 K/UL (ref 1.5–4)
LYMPHOCYTES RELATIVE PERCENT: 29 % (ref 20–42)
MCH RBC QN AUTO: 32.1 PG (ref 26–35)
MCHC RBC AUTO-ENTMCNC: 34.5 G/DL (ref 32–34.5)
MCV RBC AUTO: 93.2 FL (ref 80–99.9)
MONOCYTES ABSOLUTE: 0.64 K/UL (ref 0.1–0.95)
MONOCYTES RELATIVE PERCENT: 14 % (ref 2–12)
NEUTROPHILS ABSOLUTE: 2.34 K/UL (ref 1.8–7.3)
NEUTROPHILS RELATIVE PERCENT: 52 % (ref 43–80)
NITRITE, URINE: NEGATIVE
PDW BLD-RTO: 13.3 % (ref 11.5–15)
PH, URINE: 7.5 (ref 5–9)
PLATELET # BLD: 239 K/UL (ref 130–450)
PMV BLD AUTO: 10.1 FL (ref 7–12)
POTASSIUM SERPL-SCNC: 4.5 MMOL/L (ref 3.5–5)
PROTEIN UA: NEGATIVE MG/DL
RBC # BLD: 4.11 M/UL (ref 3.5–5.5)
SODIUM BLD-SCNC: 131 MMOL/L (ref 132–146)
SPECIFIC GRAVITY UA: 1.01 (ref 1–1.03)
THYROXINE (T4): 4.9 UG/DL (ref 4.5–11.7)
TOTAL PROTEIN: 7.3 G/DL (ref 6.4–8.3)
TRIGL SERPL-MCNC: 53 MG/DL
TSH SERPL DL<=0.05 MIU/L-ACNC: 3.35 UIU/ML (ref 0.27–4.2)
TURBIDITY: CLEAR
URINE HGB: NEGATIVE
UROBILINOGEN, URINE: 0.2 EU/DL (ref 0–1)
VITAMIN B-12: 433 PG/ML (ref 211–946)
VITAMIN D 25-HYDROXY: 40.3 NG/ML (ref 30–100)
VLDLC SERPL CALC-MCNC: 11 MG/DL
WBC # BLD: 4.5 K/UL (ref 4.5–11.5)

## 2024-11-26 ENCOUNTER — OFFICE VISIT (OUTPATIENT)
Dept: PRIMARY CARE CLINIC | Age: 64
End: 2024-11-26
Payer: COMMERCIAL

## 2024-11-26 VITALS
OXYGEN SATURATION: 93 % | HEART RATE: 77 BPM | WEIGHT: 134 LBS | BODY MASS INDEX: 23.74 KG/M2 | RESPIRATION RATE: 16 BRPM | TEMPERATURE: 98.6 F

## 2024-11-26 DIAGNOSIS — Z00.01 ENCOUNTER FOR ANNUAL GENERAL MEDICAL EXAMINATION WITH ABNORMAL FINDINGS IN ADULT: Primary | ICD-10-CM

## 2024-11-26 DIAGNOSIS — E03.9 ACQUIRED HYPOTHYROIDISM: ICD-10-CM

## 2024-11-26 DIAGNOSIS — E55.9 VITAMIN D DEFICIENCY: ICD-10-CM

## 2024-11-26 PROBLEM — M81.0 OSTEOPOROSIS WITHOUT CURRENT PATHOLOGICAL FRACTURE: Chronic | Status: RESOLVED | Noted: 2019-10-25 | Resolved: 2024-11-26

## 2024-11-26 PROBLEM — M16.12 PRIMARY LOCALIZED OSTEOARTHRITIS OF LEFT HIP: Status: RESOLVED | Noted: 2022-05-03 | Resolved: 2024-11-26

## 2024-11-26 PROCEDURE — 99396 PREV VISIT EST AGE 40-64: CPT | Performed by: FAMILY MEDICINE

## 2024-11-26 PROCEDURE — G8484 FLU IMMUNIZE NO ADMIN: HCPCS | Performed by: FAMILY MEDICINE

## 2024-11-26 RX ORDER — LEVOTHYROXINE SODIUM 88 UG/1
88 TABLET ORAL DAILY
Qty: 90 TABLET | Refills: 12 | Status: SHIPPED | OUTPATIENT
Start: 2024-11-26

## 2024-11-26 ASSESSMENT — ENCOUNTER SYMPTOMS
RESPIRATORY NEGATIVE: 1
GASTROINTESTINAL NEGATIVE: 1
EYES NEGATIVE: 1
ALLERGIC/IMMUNOLOGIC NEGATIVE: 1

## 2024-11-26 NOTE — PROGRESS NOTES
24  Name: Vita Thakur    : 1960    Sex: female    Age: 64 y.o.        Subjective:  Chief Complaint: Patient is here for 1 year check regarding wellness thyroid blood pressure arthritis hip    Feels well.  Doing well.  No chest pain or shortness of breath.  Cholesterol okay.  Left hip sl flare  and  better now        Review of Systems   Constitutional: Negative.    HENT: Negative.     Eyes: Negative.    Respiratory: Negative.     Cardiovascular: Negative.    Gastrointestinal: Negative.    Endocrine: Negative.    Genitourinary: Negative.    Musculoskeletal: Negative.    Skin: Negative.    Allergic/Immunologic: Negative.    Neurological: Negative.    Hematological: Negative.    Psychiatric/Behavioral: Negative.           Current Outpatient Medications:     levothyroxine (SYNTHROID) 88 MCG tablet, Take 1 tablet by mouth daily, Disp: 90 tablet, Rfl: 12  No Known Allergies  Social History     Socioeconomic History    Marital status: Single     Spouse name: Not on file    Number of children: Not on file    Years of education: Not on file    Highest education level: Not on file   Occupational History    Not on file   Tobacco Use    Smoking status: Never    Smokeless tobacco: Never   Substance and Sexual Activity    Alcohol use: Yes     Comment: occassionally    Drug use: No    Sexual activity: Not on file   Other Topics Concern    Not on file   Social History Narrative            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 Pastrana 2021 due 10 years    Left hip arthritis    seeing  dr vance------- cristino sanchez   left total hip  2-15-     Social Determinants of

## 2024-12-07 ENCOUNTER — HOSPITAL ENCOUNTER (OUTPATIENT)
Dept: GENERAL RADIOLOGY | Age: 64
Discharge: HOME | End: 2024-12-09
Payer: COMMERCIAL

## 2024-12-07 ENCOUNTER — TELEPHONE (OUTPATIENT)
Dept: PRIMARY CARE CLINIC | Age: 64
End: 2024-12-07

## 2024-12-07 ENCOUNTER — OFFICE VISIT (OUTPATIENT)
Dept: FAMILY MEDICINE CLINIC | Age: 64
End: 2024-12-07
Payer: COMMERCIAL

## 2024-12-07 ENCOUNTER — HOSPITAL ENCOUNTER (OUTPATIENT)
Age: 64
Discharge: HOME | End: 2024-12-09
Payer: COMMERCIAL

## 2024-12-07 VITALS
HEIGHT: 63 IN | DIASTOLIC BLOOD PRESSURE: 80 MMHG | SYSTOLIC BLOOD PRESSURE: 134 MMHG | HEART RATE: 84 BPM | TEMPERATURE: 97.4 F | WEIGHT: 134 LBS | OXYGEN SATURATION: 98 % | BODY MASS INDEX: 23.74 KG/M2
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
## 2024-12-07 DIAGNOSIS — M25.531 RIGHT WRIST PAIN: ICD-10-CM

## 2024-12-07 DIAGNOSIS — M25.531 RIGHT WRIST PAIN: Primary | ICD-10-CM

## 2024-12-07 PROCEDURE — 73100 X-RAY EXAM OF WRIST: CPT

## 2024-12-07 PROCEDURE — 73130 X-RAY EXAM OF HAND: CPT

## 2024-12-07 PROCEDURE — G8427 DOCREV CUR MEDS BY ELIG CLIN: HCPCS | Performed by: FAMILY MEDICINE

## 2024-12-07 PROCEDURE — 99213 OFFICE O/P EST LOW 20 MIN: CPT | Performed by: FAMILY MEDICINE

## 2024-12-07 PROCEDURE — G8420 CALC BMI NORM PARAMETERS: HCPCS | Performed by: FAMILY MEDICINE

## 2024-12-07 PROCEDURE — 1036F TOBACCO NON-USER: CPT | Performed by: FAMILY MEDICINE

## 2024-12-07 PROCEDURE — G8484 FLU IMMUNIZE NO ADMIN: HCPCS | Performed by: FAMILY MEDICINE

## 2024-12-07 PROCEDURE — 3017F COLORECTAL CA SCREEN DOC REV: CPT | Performed by: FAMILY MEDICINE

## 2024-12-07 ASSESSMENT — ENCOUNTER SYMPTOMS
VOMITING: 0
NAUSEA: 0
WHEEZING: 0
ABDOMINAL PAIN: 0
CONSTIPATION: 0
COUGH: 0
BACK PAIN: 0
DIARRHEA: 0
SHORTNESS OF BREATH: 0

## 2024-12-07 NOTE — PROGRESS NOTES
Smoking Status  Never      Smokeless Tobacco Use  Never                     Vitals:    12/07/24 0919   BP: 134/80   Pulse: 84   Temp: 97.4 °F (36.3 °C)   SpO2: 98%   Weight: 60.8 kg (134 lb)   Height: 1.6 m (5' 3\")       Physical Exam:  Physical Exam  Vitals and nursing note reviewed.   Constitutional:       General: She is not in acute distress.     Appearance: Normal appearance. She is well-developed and normal weight. She is not ill-appearing.   HENT:      Head: Normocephalic and atraumatic.      Right Ear: Hearing and external ear normal.      Left Ear: Hearing and external ear normal.      Nose: Nose normal.      Mouth/Throat:      Mouth: Mucous membranes are moist.   Eyes:      General: Lids are normal. No scleral icterus.     Extraocular Movements: Extraocular movements intact.      Conjunctiva/sclera: Conjunctivae normal.   Neck:      Thyroid: No thyromegaly.   Pulmonary:      Effort: Pulmonary effort is normal. No respiratory distress.      Breath sounds: No wheezing.   Musculoskeletal:         General: Normal range of motion.      Right wrist: No swelling or deformity. Normal range of motion.      Right hand: Swelling and tenderness present. No deformity. Normal range of motion.      Left hand: Normal.      Cervical back: Normal range of motion.      Right lower leg: No edema.      Left lower leg: No edema.   Skin:     General: Skin is warm and dry.      Findings: No rash.   Neurological:      Mental Status: She is alert and oriented to person, place, and time.      Gait: Gait normal.   Psychiatric:         Mood and Affect: Mood and affect normal.         Speech: Speech normal.         Behavior: Behavior normal.         Thought Content: Thought content normal.         No results found for this visit on 12/07/24.    Assessment and Plan:  Assessment & Plan  Right wrist pain    Significant OA of hands, but swelling of 1st web space with bruising.  Will send for OP xrays at the hospital today.  Brace

## 2024-12-07 NOTE — TELEPHONE ENCOUNTER
Lm for pt per Dr. Baxter xrays neg for fx and shows arthritis. Advised continue wearing brace for wrist and follow up with PCP next week

## 2025-03-10 ENCOUNTER — RESULTS FOLLOW-UP (OUTPATIENT)
Dept: FAMILY MEDICINE CLINIC | Age: 65
End: 2025-03-10

## 2025-03-10 ENCOUNTER — OFFICE VISIT (OUTPATIENT)
Dept: FAMILY MEDICINE CLINIC | Age: 65
End: 2025-03-10
Payer: COMMERCIAL

## 2025-03-10 VITALS
TEMPERATURE: 98.7 F | WEIGHT: 134 LBS | SYSTOLIC BLOOD PRESSURE: 116 MMHG | HEART RATE: 70 BPM | BODY MASS INDEX: 23.74 KG/M2 | OXYGEN SATURATION: 98 % | DIASTOLIC BLOOD PRESSURE: 68 MMHG | HEIGHT: 63 IN
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
## 2025-03-10 DIAGNOSIS — R07.81 RIB PAIN ON RIGHT SIDE: ICD-10-CM

## 2025-03-10 LAB
BILIRUBIN, POC: NEGATIVE
BLOOD URINE, POC: NORMAL
CLARITY, POC: CLEAR
COLOR, POC: YELLOW
GLUCOSE URINE, POC: NEGATIVE MG/DL
KETONES, POC: NEGATIVE MG/DL
LEUKOCYTE EST, POC: NEGATIVE
NITRITE, POC: NEGATIVE
PH, POC: 6.5
PROTEIN, POC: NEGATIVE MG/DL
SPECIFIC GRAVITY, POC: 1.01
UROBILINOGEN, POC: 0.2 MG/DL

## 2025-03-10 PROCEDURE — 3006F CXR DOC REV: CPT | Performed by: PHYSICIAN ASSISTANT

## 2025-03-10 PROCEDURE — 3028F O2 SATURATION DOC REV: CPT | Performed by: PHYSICIAN ASSISTANT

## 2025-03-10 PROCEDURE — 81002 URINALYSIS NONAUTO W/O SCOPE: CPT | Performed by: PHYSICIAN ASSISTANT

## 2025-03-10 PROCEDURE — 99214 OFFICE O/P EST MOD 30 MIN: CPT | Performed by: PHYSICIAN ASSISTANT

## 2025-03-10 NOTE — PROGRESS NOTES
3/10/25  Vita Thakur : 1960 Sex: female  Age 64 y.o.      Subjective:  Chief Complaint   Patient presents with    Fall     Last night     Back Pain     Right side    Nausea         HPI:     History of Present Illness  The patient is a 64-year-old female who presents for evaluation of a fall.    She experienced a fall last night, tripping over a suitcase and landing on a heater. She reports intermittent nausea, a symptom she does not typically experience. She suspects the nausea may be a side effect of the aspirin she took post-fall, as she does not regularly take medication. She has no history of rib fractures. As a , she finds certain activities painful due to the injury. She is scheduled to travel out of town on Thursday and is seeking reassurance regarding her health status.    MEDICATIONS  Aspirin.            ROS:   Unless otherwise stated in this report the patient's positive and negative responses for review of systems for constitutional, eyes, ENT, cardiovascular, respiratory, gastrointestinal, neurological, , musculoskeletal, and integument systems and related systems to the presenting problem are either stated in the history of present illness or were not pertinent or were negative for the symptoms and/or complaints related to the presenting medical problem.  Positives and pertinent negatives as per HPI.  All others reviewed and are negative.      PMH:     Past Medical History:   Diagnosis Date    Amenorrhea     Hyperlipidemia     Hypothyroidism     Hypothyroidism     Osteopenia of hip     Osteoporosis without current pathological fracture 10/25/2019    Polycystic ovaries     Primary localized osteoarthritis of left hip 2022       History reviewed. No pertinent surgical history.    Family History   Problem Relation Age of Onset    Other Mother     Osteoporosis Mother     Arthritis Mother     Heart Disease Mother     Alzheimer's Disease Father     Prostate Cancer Father